# Patient Record
Sex: MALE | Race: WHITE | NOT HISPANIC OR LATINO | Employment: FULL TIME | URBAN - METROPOLITAN AREA
[De-identification: names, ages, dates, MRNs, and addresses within clinical notes are randomized per-mention and may not be internally consistent; named-entity substitution may affect disease eponyms.]

---

## 2017-04-08 ENCOUNTER — APPOINTMENT (OUTPATIENT)
Dept: CT IMAGING | Facility: CLINIC | Age: 42
End: 2017-04-08
Attending: EMERGENCY MEDICINE
Payer: COMMERCIAL

## 2017-04-08 ENCOUNTER — HOSPITAL ENCOUNTER (EMERGENCY)
Facility: CLINIC | Age: 42
Discharge: HOME | End: 2017-04-08
Attending: EMERGENCY MEDICINE | Admitting: EMERGENCY MEDICINE
Payer: COMMERCIAL

## 2017-04-08 VITALS
SYSTOLIC BLOOD PRESSURE: 126 MMHG | OXYGEN SATURATION: 97 % | TEMPERATURE: 97.8 F | BODY MASS INDEX: 23.19 KG/M2 | RESPIRATION RATE: 16 BRPM | WEIGHT: 175 LBS | HEIGHT: 73 IN | DIASTOLIC BLOOD PRESSURE: 94 MMHG | HEART RATE: 71 BPM

## 2017-04-08 DIAGNOSIS — N20.1 RIGHT DISTAL URETERAL CALCULUS: ICD-10-CM

## 2017-04-08 LAB
ALBUMIN SERPL-MCNC: 4.2 G/DL (ref 3.4–5)
ALBUMIN UR-MCNC: 10 MG/DL
ALP SERPL-CCNC: 64 U/L (ref 40–150)
ALT SERPL W P-5'-P-CCNC: 25 U/L (ref 0–70)
ANION GAP SERPL CALCULATED.3IONS-SCNC: 7 MMOL/L (ref 3–14)
APPEARANCE UR: CLEAR
AST SERPL W P-5'-P-CCNC: 19 U/L (ref 0–45)
BASOPHILS # BLD AUTO: 0 10E9/L (ref 0–0.2)
BASOPHILS NFR BLD AUTO: 0.3 %
BILIRUB SERPL-MCNC: 1.7 MG/DL (ref 0.2–1.3)
BILIRUB UR QL STRIP: NEGATIVE
BUN SERPL-MCNC: 9 MG/DL (ref 7–30)
CALCIUM SERPL-MCNC: 8.9 MG/DL (ref 8.5–10.1)
CHLORIDE SERPL-SCNC: 103 MMOL/L (ref 94–109)
CO2 SERPL-SCNC: 29 MMOL/L (ref 20–32)
COLOR UR AUTO: YELLOW
CREAT SERPL-MCNC: 0.9 MG/DL (ref 0.66–1.25)
DIFFERENTIAL METHOD BLD: ABNORMAL
EOSINOPHIL # BLD AUTO: 0.1 10E9/L (ref 0–0.7)
EOSINOPHIL NFR BLD AUTO: 1 %
ERYTHROCYTE [DISTWIDTH] IN BLOOD BY AUTOMATED COUNT: 12.4 % (ref 10–15)
GFR SERPL CREATININE-BSD FRML MDRD: ABNORMAL ML/MIN/1.7M2
GLUCOSE SERPL-MCNC: 102 MG/DL (ref 70–99)
GLUCOSE UR STRIP-MCNC: NEGATIVE MG/DL
HCT VFR BLD AUTO: 50.8 % (ref 40–53)
HGB BLD-MCNC: 17.9 G/DL (ref 13.3–17.7)
HGB UR QL STRIP: ABNORMAL
IMM GRANULOCYTES # BLD: 0 10E9/L (ref 0–0.4)
IMM GRANULOCYTES NFR BLD: 0.4 %
KETONES UR STRIP-MCNC: NEGATIVE MG/DL
LEUKOCYTE ESTERASE UR QL STRIP: NEGATIVE
LYMPHOCYTES # BLD AUTO: 2.4 10E9/L (ref 0.8–5.3)
LYMPHOCYTES NFR BLD AUTO: 34.2 %
MCH RBC QN AUTO: 33.1 PG (ref 26.5–33)
MCHC RBC AUTO-ENTMCNC: 35.2 G/DL (ref 31.5–36.5)
MCV RBC AUTO: 94 FL (ref 78–100)
MONOCYTES # BLD AUTO: 0.7 10E9/L (ref 0–1.3)
MONOCYTES NFR BLD AUTO: 9.3 %
MUCOUS THREADS #/AREA URNS LPF: PRESENT /LPF
NEUTROPHILS # BLD AUTO: 3.8 10E9/L (ref 1.6–8.3)
NEUTROPHILS NFR BLD AUTO: 54.8 %
NITRATE UR QL: NEGATIVE
NRBC # BLD AUTO: 0 10*3/UL
NRBC BLD AUTO-RTO: 0 /100
PH UR STRIP: 5.5 PH (ref 5–7)
PLATELET # BLD AUTO: 261 10E9/L (ref 150–450)
POTASSIUM SERPL-SCNC: 4 MMOL/L (ref 3.4–5.3)
PROT SERPL-MCNC: 7.8 G/DL (ref 6.8–8.8)
RBC # BLD AUTO: 5.41 10E12/L (ref 4.4–5.9)
RBC #/AREA URNS AUTO: 39 /HPF (ref 0–2)
SODIUM SERPL-SCNC: 139 MMOL/L (ref 133–144)
SP GR UR STRIP: 1.01 (ref 1–1.03)
URN SPEC COLLECT METH UR: ABNORMAL
UROBILINOGEN UR STRIP-MCNC: NORMAL MG/DL (ref 0–2)
WBC # BLD AUTO: 7 10E9/L (ref 4–11)
WBC #/AREA URNS AUTO: 2 /HPF (ref 0–2)

## 2017-04-08 PROCEDURE — 81001 URINALYSIS AUTO W/SCOPE: CPT | Performed by: EMERGENCY MEDICINE

## 2017-04-08 PROCEDURE — 96361 HYDRATE IV INFUSION ADD-ON: CPT

## 2017-04-08 PROCEDURE — 96374 THER/PROPH/DIAG INJ IV PUSH: CPT

## 2017-04-08 PROCEDURE — 99285 EMERGENCY DEPT VISIT HI MDM: CPT | Mod: 25

## 2017-04-08 PROCEDURE — 85025 COMPLETE CBC W/AUTO DIFF WBC: CPT | Performed by: EMERGENCY MEDICINE

## 2017-04-08 PROCEDURE — 74176 CT ABD & PELVIS W/O CONTRAST: CPT

## 2017-04-08 PROCEDURE — 25000128 H RX IP 250 OP 636: Performed by: EMERGENCY MEDICINE

## 2017-04-08 PROCEDURE — 80053 COMPREHEN METABOLIC PANEL: CPT | Performed by: EMERGENCY MEDICINE

## 2017-04-08 RX ORDER — ONDANSETRON 4 MG/1
4 TABLET, ORALLY DISINTEGRATING ORAL EVERY 4 HOURS PRN
Qty: 10 TABLET | Refills: 0 | Status: SHIPPED | OUTPATIENT
Start: 2017-04-08 | End: 2023-07-07

## 2017-04-08 RX ORDER — KETOROLAC TROMETHAMINE 30 MG/ML
30 INJECTION, SOLUTION INTRAMUSCULAR; INTRAVENOUS ONCE
Status: COMPLETED | OUTPATIENT
Start: 2017-04-08 | End: 2017-04-08

## 2017-04-08 RX ORDER — OXYCODONE AND ACETAMINOPHEN 5; 325 MG/1; MG/1
1-2 TABLET ORAL EVERY 4 HOURS PRN
Qty: 20 TABLET | Refills: 0 | Status: SHIPPED | OUTPATIENT
Start: 2017-04-08 | End: 2023-07-07

## 2017-04-08 RX ORDER — TAMSULOSIN HYDROCHLORIDE 0.4 MG/1
0.4 CAPSULE ORAL DAILY
Qty: 5 CAPSULE | Refills: 0 | Status: SHIPPED | OUTPATIENT
Start: 2017-04-08 | End: 2023-07-07

## 2017-04-08 RX ADMIN — SODIUM CHLORIDE 1000 ML: 9 INJECTION, SOLUTION INTRAVENOUS at 06:22

## 2017-04-08 RX ADMIN — KETOROLAC TROMETHAMINE 30 MG: 30 INJECTION, SOLUTION INTRAMUSCULAR at 06:24

## 2017-04-08 ASSESSMENT — ENCOUNTER SYMPTOMS
FLANK PAIN: 1
FEVER: 0
HEMATURIA: 0
NAUSEA: 0
CONSTIPATION: 0
VOMITING: 0
BLOOD IN STOOL: 0
DYSURIA: 1
DIARRHEA: 0
CHILLS: 0

## 2017-04-08 NOTE — ED AVS SNAPSHOT
Emergency Department    [address] [place] [address]    Phone:  [phone]    Fax:  [phone]                                       Jono Hensley   MRN: [id number]    Department:   Emergency Department   Date of Visit:  4/8/2017           After Visit Summary Signature Page     I have received my discharge instructions, and my questions have been answered. I have discussed any challenges I see with this plan with the nurse or doctor.    ..........................................................................................................................................  Patient/Patient Representative Signature      ..........................................................................................................................................  Patient Representative Print Name and Relationship to Patient    ..................................................               ................................................  Date                                            Time    ..........................................................................................................................................  Reviewed by Signature/Title    ...................................................              ..............................................  Date                                                            Time

## 2017-04-08 NOTE — ED PROVIDER NOTES
"  History     Chief Complaint:  Flank pain     HPI   Jono Hensley is a 41 year old male who presents to the emergency department today with flank pain. Today at 0100 the patient had onset of dysuria, but denies hematuria. Today at 0500 the patient had onset of right sided waxing and waning flank pain that radiates to the right lower quadrant of his abdominal and right anterior groin. Here in the ED, he states that his pain is improved compared to the initial onset. He states that his current symptoms are similar is prior kidney stone. Denies fevers or chills. Denies changes to bowel habits. Denies nausea or vomiting.     Allergies:  No Known Drug Allergies     Medications:    The patient is currently on no regular medications.      Past Medical History:    Kidney stone     Past Surgical History:    History reviewed. No pertinent past surgical history.     Family History:    History reviewed. No pertinent family history.        Social History:  Smoking Status: Never smoker  Alcohol Use: Yes     Marital Status:  Single     Review of Systems   Constitutional: Negative for chills and fever.   Gastrointestinal: Negative for blood in stool, constipation, diarrhea, nausea and vomiting.   Genitourinary: Positive for dysuria and flank pain. Negative for hematuria.   All other systems reviewed and are negative.    Physical Exam   First Vitals:  BP: 141/89  Heart Rate: 76  Temp: 97.8  F (36.6  C)  Resp: 16  Height: 185.4 cm (6' 1\")  Weight: 79.4 kg (175 lb)  SpO2: 97 %    Physical Exam  Nursing note and vitals reviewed.  Constitutional:  Appears well-developed and well-nourished.   HENT:   Head:    Atraumatic.   Mouth/Throat:   Oropharynx is clear and moist. No oropharyngeal exudate.   Eyes:    Pupils are equal, round, and reactive to light.   Neck:    Normal range of motion. Neck supple.      No tracheal deviation present. No thyromegaly present.   Cardiovascular:  Normal rate, regular rhythm, no murmur "   Pulmonary/Chest: Breath sounds are clear and equal without wheezes or crackles.  Abdominal:   Soft. Bowel sounds are normal. Exhibits no distension and      no mass. There is no tenderness.      There is no rebound and no guarding.   :   Testicles are nontender without swelling. No palpable inguinal hernia.   Musculoskeletal:  Exhibits no edema.   Back:   No CVA tenderness.   Lymphadenopathy:  No cervical adenopathy.   Neurological:   Alert and oriented to person, place, and time.   Skin:    Skin is warm and dry. No rash noted. No pallor.      Emergency Department Course     Imaging:  Radiology findings were communicated with the patient who voiced understanding of the findings.    CT Abdomen Pelvis w/o Contrast:   IMPRESSION: There is a 0.2 cm calculus in the right ureterovesical  junction resulting in mild right hydronephrosis.  Result per radiology     Laboratory:  Laboratory findings were communicated with the patient who voiced understanding of the findings.    CBC:WBC 7.0, HGB 17.9 (H),    CMP: Glucose 102 (H), Bilirubin 1.7 (H) o/w WNL Creatinine 0.90     UA: Moderate Blood (A), Protein albumin 10 (A), RBC 39 (H), Mucous Present (A) o/w Negative      Interventions:  0622 NS 1000 mL IV   0624 Toradol 30 mg IV     Emergency Department Course:  Nursing notes and vitals reviewed.  I performed an exam of the patient as documented above.   IV was inserted and blood was drawn for laboratory testing, results above.   The patient provided a urine sample here in the emergency department. This was sent for laboratory testing, findings above.   The patient was sent for a CT Abdomen Pelvis w/o Contrast while in the emergency department, results above.    I discussed the treatment plan with the patient. They expressed understanding of this plan and consented to discharge. They will be discharged home with instructions for care and follow up. In addition, the patient will return to the emergency department if  their symptoms persist, worsen, if new symptoms arise or if there is any concern.  All questions were answered.     Impression & Plan      Medical Decision Making:  Jono Hensley is a 41 year old male who presents with flank pain. A broad differential diagnosis was considered including diverticulitis, ureterolithiasis, tumor, colitis, cholecystitis, aneurysm, dissection, volvulus, appendicitis, splenic issue, stomach pathology, ulcer, hydronephrosis, pneumonia, rib fracture, UTI, pyelonephritis amongst many other etiologies.   Signs and symptoms consistent with ureterolithiasis and CT of the abdomen confirms this. No sign of pyelonephritis on urineanalysis  Patients pain is controlled in ED and given flomax.  No signs of infected stone.  Patient is hemodynamically stable in ED. Plan is home with abdominal pain recheck by primary care physician or return to ED if symptoms worsen.  Return for fevers greater than 102, increasing pain, other new symptoms develop.  Ureterolithiasis precautions for home.  Questions were answered.     Diagnosis:    ICD-10-CM    1. Right distal ureteral calculus N20.1       Discharge Medications:  New Prescriptions    ONDANSETRON (ZOFRAN ODT) 4 MG ODT TAB    Take 1 tablet (4 mg) by mouth every 4 hours as needed for nausea    OXYCODONE-ACETAMINOPHEN (PERCOCET) 5-325 MG PER TABLET    Take 1-2 tablets by mouth every 4 hours as needed for moderate to severe pain    TAMSULOSIN (FLOMAX) 0.4 MG CAPSULE    Take 1 capsule (0.4 mg) by mouth daily Take at bedtime       Scribe Disclosure:  Edgar JOHNSTON, am serving as a scribe at 6:12 AM on 4/8/2017 to document services personally performed by Starla Vega MD, based on my observations and the provider's statements to me.   4/8/2017    EMERGENCY DEPARTMENT       Starla Vega MD  04/08/17 3200

## 2017-04-08 NOTE — DISCHARGE INSTRUCTIONS
Take ibuprofen 600 mg every 6 hours as needed for pain (take with food).    Drink plenty of water/fluids.    Strain your urine to try to collect the stone.    Opioid Medication Information    You have been given a prescription for an opioid (narcotic) pain medicine and/or have received a pain medicine while here in the Emergency Department. These medicines can make you drowsy or impaired. You must not drive, operate dangerous equipment, or engage in any other dangerous activities while taking these medications. If you drive while taking these medications, you could be arrested for DUI, or driving under the influence. Do not drink any alcohol while you are taking these medications.   Opioid pain medications can cause addiction. If you have a history of chemical dependency of any type, you are at a higher risk of becoming addicted to pain medications.  Only take these prescribed medications to treat your pain when all other options have been tried. Take it for as short a time and as few doses as possible. Store your pain pills in a secure place, as they are frequently stolen and provide a dangerous opportunity for children or visitors in your house to start abusing these powerful medications. We will not replace any lost or stolen medicine.  As soon as your pain is better, you should flush all your remaining medication.   Many prescription pain medications contain Tylenol  (acetaminophen), including Vicodin , Tylenol #3 , Norco , Lortab , and Percocet .  You should not take any extra pills of Tylenol  if you are using these prescription medications or you can get very sick.  Do not ever take more than 4000 mg of acetaminophen in any 24 hour period.  All opioids tend to cause constipation. Drink plenty of water and eat foods that have a lot of fiber, such as fruits, vegetables, prune juice, apple juice and high fiber cereal.  Take a laxative if you don t move your bowels at least every other day. Miralax , Milk of  Magnesia, Colace , or Senna  can be used to keep you regular.

## 2017-04-08 NOTE — ED AVS SNAPSHOT
Emergency Department    [address] [facility] [address]    Phone:  [phone]    Fax:  [phone]                                       Jono Hensley   MRN: [id number]    Department:   Emergency Department   Date of Visit:  4/8/2017           Patient Information     Date Of Birth          1975        Your diagnoses for this visit were:     Right distal ureteral calculus        You were seen by Starla Vega MD.      Follow-up Information     Follow up with [facility]    Why:  call Monday to schedule a follow-up appointment.    Contact information:    [address] [facility] [address]  [address]  [facility] [phone]        Follow up with  Emergency Department.    Specialty:  EMERGENCY MEDICINE    Why:  As needed for severe pain, recurrent vomiting or fever.    Contact information:    [address] [facility] [address] [phone]        Discharge Instructions       Take ibuprofen 600 mg every 6 hours as needed for pain (take with food).    Drink plenty of water/fluids.    Strain your urine to try to collect the stone.    Opioid Medication Information    You have been given a prescription for an opioid (narcotic) pain medicine and/or have received a pain medicine while here in the Emergency Department. These medicines can make you drowsy or impaired. You must not drive, operate dangerous equipment, or engage in any other dangerous activities while taking these medications. If you drive while taking these medications, you could be arrested for DUI, or driving under the influence. Do not drink any alcohol while you are taking these medications.   Opioid pain medications can cause addiction. If you have a history of chemical dependency of any type, you are at a higher risk of becoming addicted to pain medications.  Only take these prescribed medications to treat your pain when all other options have been tried. Take it for as short a time and as  few doses as possible. Store your pain pills in a secure place, as they are frequently stolen and provide a dangerous opportunity for children or visitors in your house to start abusing these powerful medications. We will not replace any lost or stolen medicine.  As soon as your pain is better, you should flush all your remaining medication.   Many prescription pain medications contain Tylenol  (acetaminophen), including Vicodin , Tylenol #3 , Norco , Lortab , and Percocet .  You should not take any extra pills of Tylenol  if you are using these prescription medications or you can get very sick.  Do not ever take more than 4000 mg of acetaminophen in any 24 hour period.  All opioids tend to cause constipation. Drink plenty of water and eat foods that have a lot of fiber, such as fruits, vegetables, prune juice, apple juice and high fiber cereal.  Take a laxative if you don t move your bowels at least every other day. Miralax , Milk of Magnesia, Colace , or Senna  can be used to keep you regular.            Discharge References/Attachments     KIDNEY STONE, PASSED (ENGLISH)      24 Hour Appointment Hotline       To make an appointment at any [facility], call [phone] ([phone]). If you don't have a family doctor or clinic, we will help you find one. [facility] are conveniently located to serve the needs of you and your family.             Review of your medicines      START taking        Dose / Directions Last dose taken    ondansetron 4 MG ODT tab   Commonly known as:  ZOFRAN ODT   Dose:  4 mg   Quantity:  10 tablet        Take 1 tablet (4 mg) by mouth every 4 hours as needed for nausea   Refills:  0        tamsulosin 0.4 MG capsule   Commonly known as:  FLOMAX   Dose:  0.4 mg   Quantity:  5 capsule        Take 1 capsule (0.4 mg) by mouth daily Take at bedtime   Refills:  0          ASK your doctor about these medications        Dose / Directions Last dose taken    * oxyCODONE-acetaminophen  5-325 MG per tablet   Commonly known as:  PERCOCET   Dose:  1-2 tablet   What changed:  Another medication with the same name was added. Make sure you understand how and when to take each.   Quantity:  20 tablet   Ask about: Which instructions should I use?        Take 1-2 tablets by mouth every 4 hours as needed for pain   Refills:  0        * oxyCODONE-acetaminophen 5-325 MG per tablet   Commonly known as:  PERCOCET   Dose:  1-2 tablet   What changed:  You were already taking a medication with the same name, and this prescription was added. Make sure you understand how and when to take each.   Quantity:  20 tablet   Ask about: Which instructions should I use?        Take 1-2 tablets by mouth every 4 hours as needed for moderate to severe pain   Refills:  0        * Notice:  This list has 2 medication(s) that are the same as other medications prescribed for you. Read the directions carefully, and ask your doctor or other care provider to review them with you.            Prescriptions were sent or printed at these locations (3 Prescriptions)                   Other Prescriptions                Printed at Department/Unit printer (3 of 3)         oxyCODONE-acetaminophen (PERCOCET) 5-325 MG per tablet               tamsulosin (FLOMAX) 0.4 MG capsule               ondansetron (ZOFRAN ODT) 4 MG ODT tab                Procedures and tests performed during your visit     Abd/pelvis CT no contrast - Stone Protocol    CBC with platelets + differential    Comprehensive metabolic panel    UA with Microscopic      Orders Needing Specimen Collection     None      Pending Results     Date and Time Order Name Status Description    4/8/2017 0616 Abd/pelvis CT no contrast - Stone Protocol Preliminary             Pending Culture Results     No orders found from 4/6/2017 to 4/9/2017.            Test Results From Your Hospital Stay        4/8/2017  6:33 AM      Component Results     Component Value Ref Range & Units Status    WBC 7.0  4.0 - 11.0 10e9/L Final    RBC Count 5.41 4.4 - 5.9 10e12/L Final    Hemoglobin 17.9 (H) 13.3 - 17.7 g/dL Final    Hematocrit 50.8 40.0 - 53.0 % Final    MCV 94 78 - 100 fl Final    MCH 33.1 (H) 26.5 - 33.0 pg Final    MCHC 35.2 31.5 - 36.5 g/dL Final    RDW 12.4 10.0 - 15.0 % Final    Platelet Count 261 150 - 450 10e9/L Final    Diff Method Automated Method  Final    % Neutrophils 54.8 % Final    % Lymphocytes 34.2 % Final    % Monocytes 9.3 % Final    % Eosinophils 1.0 % Final    % Basophils 0.3 % Final    % Immature Granulocytes 0.4 % Final    Nucleated RBCs 0 0 /100 Final    Absolute Neutrophil 3.8 1.6 - 8.3 10e9/L Final    Absolute Lymphocytes 2.4 0.8 - 5.3 10e9/L Final    Absolute Monocytes 0.7 0.0 - 1.3 10e9/L Final    Absolute Eosinophils 0.1 0.0 - 0.7 10e9/L Final    Absolute Basophils 0.0 0.0 - 0.2 10e9/L Final    Abs Immature Granulocytes 0.0 0 - 0.4 10e9/L Final    Absolute Nucleated RBC 0.0  Final         4/8/2017  6:50 AM      Component Results     Component Value Ref Range & Units Status    Sodium 139 133 - 144 mmol/L Final    Potassium 4.0 3.4 - 5.3 mmol/L Final    Chloride 103 94 - 109 mmol/L Final    Carbon Dioxide 29 20 - 32 mmol/L Final    Anion Gap 7 3 - 14 mmol/L Final    Glucose 102 (H) 70 - 99 mg/dL Final    Urea Nitrogen 9 7 - 30 mg/dL Final    Creatinine 0.90 0.66 - 1.25 mg/dL Final    GFR Estimate >90  Non  GFR Calc   >60 mL/min/1.7m2 Final    GFR Estimate If Black >90   GFR Calc   >60 mL/min/1.7m2 Final    Calcium 8.9 8.5 - 10.1 mg/dL Final    Bilirubin Total 1.7 (H) 0.2 - 1.3 mg/dL Final    Albumin 4.2 3.4 - 5.0 g/dL Final    Protein Total 7.8 6.8 - 8.8 g/dL Final    Alkaline Phosphatase 64 40 - 150 U/L Final    ALT 25 0 - 70 U/L Final    AST 19 0 - 45 U/L Final         4/8/2017  6:37 AM      Component Results     Component Value Ref Range & Units Status    Color Urine Yellow  Final    Appearance Urine Clear  Final    Glucose Urine Negative NEG mg/dL Final     Bilirubin Urine Negative NEG Final    Ketones Urine Negative NEG mg/dL Final    Specific Gravity Urine 1.014 1.003 - 1.035 Final    Blood Urine Moderate (A) NEG Final    pH Urine 5.5 5.0 - 7.0 pH Final    Protein Albumin Urine 10 (A) NEG mg/dL Final    Urobilinogen mg/dL Normal 0.0 - 2.0 mg/dL Final    Nitrite Urine Negative NEG Final    Leukocyte Esterase Urine Negative NEG Final    Source Midstream Urine  Final    WBC Urine 2 0 - 2 /HPF Final    RBC Urine 39 (H) 0 - 2 /HPF Final    Mucous Urine Present (A) NEG /LPF Final         4/8/2017  7:11 AM      Narrative     CT ABDOMEN AND PELVIS WITHOUT CONTRAST  4/8/2017 7:01 AM    HISTORY: Right flank pain. The concern is for a urinary tract  calculus.    COMPARISON: A CT on 10/20/2014.    TECHNIQUE: Routine transverse CT imaging of the abdomen and pelvis was  performed without contrast. Radiation dose for this scan was reduced  using automated exposure control, adjustment of the mA and/or kV  according to patient size, or iterative reconstruction technique.    FINDINGS: The visualized lung bases are clear. There has been  development of a 0.2 cm calculus in the right ureterovesical junction.  This is demonstrated on the transverse series 2 image 80. There is  mild prominence of the right renal collecting system. Again seen is a  just over 1 cm cyst in the superior pole of the right kidney. No other  urinary tract abnormality is noted. A previously seen left  ureterovesical junction stone is no longer present. The liver, spleen,  pancreas, gallbladder, and adrenal glands are normal allowing for the  absence of contrast. No enlarged lymph node or other abnormal mass is  demonstrated. No free fluid is seen. No free intraperitoneal gas is  identified. The gastrointestinal tract is unremarkable. The appendix  is not identified. There is no additional evidence of appendicitis. No  vascular abnormality is seen. The osseous structures are unremarkable.  No abdominal or  pelvic wall pathology is demonstrated.         Impression     IMPRESSION: There is a 0.2 cm calculus in the right ureterovesical  junction resulting in mild right hydronephrosis.                Clinical Quality Measure: Blood Pressure Screening     Your blood pressure was checked while you were in the emergency department today. The last reading we obtained was  BP: 141/89 . Please read the guidelines below about what these numbers mean and what you should do about them.  If your systolic blood pressure (the top number) is less than 120 and your diastolic blood pressure (the bottom number) is less than 80, then your blood pressure is normal. There is nothing more that you need to do about it.  If your systolic blood pressure (the top number) is 120-139 or your diastolic blood pressure (the bottom number) is 80-89, your blood pressure may be higher than it should be. You should have your blood pressure rechecked within a year by a primary care provider.  If your systolic blood pressure (the top number) is 140 or greater or your diastolic blood pressure (the bottom number) is 90 or greater, you may have high blood pressure. High blood pressure is treatable, but if left untreated over time it can put you at risk for heart attack, stroke, or kidney failure. You should have your blood pressure rechecked by a primary care provider within the next 4 weeks.  If your provider in the emergency department today gave you specific instructions to follow-up with your doctor or provider even sooner than that, you should follow that instruction and not wait for up to 4 weeks for your follow-up visit.        Thank you for choosing [facility]       Thank you for choosing [facility] for your care. Our goal is always to provide you with excellent care. Hearing back from our patients is one way we can continue to improve our services. Please take a few minutes to complete the written survey that you may receive in the mail after you visit  "with us. Thank you!        [facility] Information     [facility] lets you send messages to your doctor, view your test results, renew your prescriptions, schedule appointments and more. To sign up, go to [email or web address] . Click on \"Log in\" on the left side of the screen, which will take you to the Welcome page. Then click on \"Sign up Now\" on the right side of the page.     You will be asked to enter the access code listed below, as well as some personal information. Please follow the directions to create your username and password.     Your access code is: [email or web address]  Expires: 2017  7:24 AM     Your access code will  in 90 days. If you need help or a new code, please call your [facility] clinic or [phone].        Care EveryWhere ID     This is your Care EveryWhere ID. This could be used by other organizations to access your [facility] medical records  [id number]        After Visit Summary       This is your record. Keep this with you and show to your community pharmacist(s) and doctor(s) at your next visit.                  "

## 2023-04-15 ENCOUNTER — HEALTH MAINTENANCE LETTER (OUTPATIENT)
Age: 48
End: 2023-04-15

## 2023-06-14 ENCOUNTER — OFFICE VISIT (OUTPATIENT)
Dept: OPTOMETRY | Facility: CLINIC | Age: 48
End: 2023-06-14
Payer: COMMERCIAL

## 2023-06-14 DIAGNOSIS — H52.223 REGULAR ASTIGMATISM OF BOTH EYES: ICD-10-CM

## 2023-06-14 DIAGNOSIS — D31.31 CHOROIDAL NEVUS OF RIGHT EYE: ICD-10-CM

## 2023-06-14 DIAGNOSIS — Z01.01 ENCOUNTER FOR EXAMINATION OF EYES AND VISION WITH ABNORMAL FINDINGS: Primary | ICD-10-CM

## 2023-06-14 DIAGNOSIS — H52.4 PRESBYOPIA: ICD-10-CM

## 2023-06-14 DIAGNOSIS — H10.402 CHRONIC CONJUNCTIVITIS OF LEFT EYE, UNSPECIFIED CHRONIC CONJUNCTIVITIS TYPE: ICD-10-CM

## 2023-06-14 DIAGNOSIS — H52.13 MYOPIA OF BOTH EYES: ICD-10-CM

## 2023-06-14 PROBLEM — R73.01 IMPAIRED FASTING GLUCOSE: Status: ACTIVE | Noted: 2021-11-15

## 2023-06-14 PROBLEM — M79.604 LEG PAIN, ANTERIOR, RIGHT: Status: ACTIVE | Noted: 2017-10-18

## 2023-06-14 PROBLEM — K58.0 IRRITABLE BOWEL SYNDROME WITH DIARRHEA: Status: ACTIVE | Noted: 2018-10-11

## 2023-06-14 PROBLEM — H69.91 DYSFUNCTION OF RIGHT EUSTACHIAN TUBE: Status: ACTIVE | Noted: 2018-10-11

## 2023-06-14 PROBLEM — J30.9 ALLERGIC RHINITIS: Status: ACTIVE | Noted: 2023-06-14

## 2023-06-14 PROBLEM — L43.9 LICHEN PLANUS: Status: ACTIVE | Noted: 2018-10-10

## 2023-06-14 PROCEDURE — 92015 DETERMINE REFRACTIVE STATE: CPT | Performed by: OPTOMETRIST

## 2023-06-14 PROCEDURE — 92004 COMPRE OPH EXAM NEW PT 1/>: CPT | Performed by: OPTOMETRIST

## 2023-06-14 RX ORDER — NEOMYCIN SULFATE, POLYMYXIN B SULFATE AND DEXAMETHASONE 3.5; 10000; 1 MG/ML; [USP'U]/ML; MG/ML
SUSPENSION/ DROPS OPHTHALMIC
Qty: 1.2 ML | Refills: 0 | Status: SHIPPED | OUTPATIENT
Start: 2023-06-14 | End: 2023-06-22

## 2023-06-14 ASSESSMENT — VISUAL ACUITY
OD_SC: 20/20-2
METHOD: SNELLEN - LINEAR
OS_SC: 20/70
OD_SC: 20/70
OS_SC: 20/20-1

## 2023-06-14 ASSESSMENT — REFRACTION_MANIFEST
METHOD_AUTOREFRACTION: 1
OD_CYLINDER: +1.25
OD_SPHERE: -2.00
OD_CYLINDER: +1.00
OS_CYLINDER: +1.75
OS_AXIS: 172
OD_AXIS: 021
OD_AXIS: 030
OS_AXIS: 161
OS_CYLINDER: +1.25
OS_SPHERE: -2.50
OD_SPHERE: -1.75
OS_SPHERE: -2.25
OD_ADD: +1.75
OS_ADD: +1.75

## 2023-06-14 ASSESSMENT — TONOMETRY
IOP_METHOD: APPLANATION
OS_IOP_MMHG: 10
OD_IOP_MMHG: 10

## 2023-06-14 ASSESSMENT — CONF VISUAL FIELD
OS_INFERIOR_NASAL_RESTRICTION: 0
OS_SUPERIOR_NASAL_RESTRICTION: 0
OD_INFERIOR_TEMPORAL_RESTRICTION: 0
OD_SUPERIOR_NASAL_RESTRICTION: 0
OD_SUPERIOR_TEMPORAL_RESTRICTION: 0
METHOD: COUNTING FINGERS
OS_NORMAL: 1
OD_INFERIOR_NASAL_RESTRICTION: 0
OD_NORMAL: 1
OS_SUPERIOR_TEMPORAL_RESTRICTION: 0
OS_INFERIOR_TEMPORAL_RESTRICTION: 0

## 2023-06-14 ASSESSMENT — CUP TO DISC RATIO
OD_RATIO: 0.2
OS_RATIO: 0.2

## 2023-06-14 ASSESSMENT — KERATOMETRY
OD_AXISANGLE_DEGREES: 035
OD_AXISANGLE2_DEGREES: 125
OS_AXISANGLE2_DEGREES: 064
OS_K2POWER_DIOPTERS: 47.25
OS_AXISANGLE_DEGREES: 154
OS_K1POWER_DIOPTERS: 46.50
OD_K2POWER_DIOPTERS: 46.75
OD_K1POWER_DIOPTERS: 46.25

## 2023-06-14 ASSESSMENT — SLIT LAMP EXAM - LIDS
COMMENTS: NORMAL
COMMENTS: NORMAL

## 2023-06-14 ASSESSMENT — EXTERNAL EXAM - RIGHT EYE: OD_EXAM: NORMAL

## 2023-06-14 ASSESSMENT — EXTERNAL EXAM - LEFT EYE: OS_EXAM: NORMAL

## 2023-06-14 NOTE — LETTER
6/14/2023         RE: Jono Hensley  [address]  [facility] [address]        Dear Colleague,    Thank you for referring your patient, Jono Hensley, to the [facility]. Please see a copy of my visit note below.    Chief Complaint   Patient presents with     Annual Eye Exam      Accompanied by partner, Romy    -Was on course of Tobrex in May for conjunctivitis  - completed recently     Last Eye Exam: 10+ yrs   Dilated Previously: Yes, side effects of dilation explained today    What are you currently using to see?  Glasses - SVL - wears only for watching TV at night - did not bring with today      Distance Vision Acuity: Noticed gradual change in left eye     Near Vision Acuity: Satisfied with vision while reading and using computer unaided    Eye Comfort: dry, watery and itchy - L>R - states his left eye has been watering/red frequently for ~10 yrs. Reports he gets a few bouts of conjunctivitis per year     Do you use eye drops? : Yes: OTC allergy drops BID for the past week or so since discontinuing antibiotic drops     Occupation or Hobbies: [employer] cars - very tammy environment - does not wear safety glasses    [facility]        Medical, surgical and family histories reviewed and updated 6/14/2023.       OBJECTIVE: See Ophthalmology exam    ASSESSMENT:    ICD-10-CM    1. Encounter for examination of eyes and vision with abnormal findings  Z01.01 EYE EXAM (SIMPLE-NONBILLABLE)      2. Chronic conjunctivitis of left eye, unspecified chronic conjunctivitis type  H10.402 EYE EXAM (SIMPLE-NONBILLABLE)     neomycin-polymyxin-dexamethasone (MAXITROL) 3.5-07075-2.1 SUSP ophthalmic susp      3. Choroidal nevus of right eye  D31.31 EYE EXAM (SIMPLE-NONBILLABLE)      4. Myopia of both eyes  H52.13 EYE EXAM (SIMPLE-NONBILLABLE)     REFRACTION      5. Regular astigmatism of both eyes  H52.223 EYE EXAM (SIMPLE-NONBILLABLE)     REFRACTION      6. Presbyopia  H52.4 EYE EXAM  (SIMPLE-NONBILLABLE)     REFRACTION          PLAN:     Patient Instructions   Begin course of Maxitrol eyedrops. Use 4x daily in the left eye for 4 days, then reduce to 2x daily left eye for 4 days, then discontinue.     Continue allergy eyedrops as needed.     Distance glasses prescription provided today.   Allow 2 weeks to adapt to the new glasses.   Wear glasses for driving.     Return in 1 year for a comprehensive eye exam, or sooner if needed.      The effects of the dilating drops last for 4- 6 hours.  You will be more sensitive to light and vision will be blurry up close.  Mydriatic sunglasses were given if needed.     Guido Agosto, ESSIE  [address]  [place]  [id number] [phone]            Again, thank you for allowing me to participate in the care of your patient.        Sincerely,        Guido Agosto OD

## 2023-06-14 NOTE — PATIENT INSTRUCTIONS
Begin course of Maxitrol eyedrops. Use 4x daily in the left eye for 4 days, then reduce to 2x daily left eye for 4 days, then discontinue.     Continue allergy eyedrops as needed.     Distance glasses prescription provided today.   Allow 2 weeks to adapt to the new glasses.   Wear glasses for driving.     Return in 1 year for a comprehensive eye exam, or sooner if needed.      The effects of the dilating drops last for 4- 6 hours.  You will be more sensitive to light and vision will be blurry up close.  Mydriatic sunglasses were given if needed.     Guido Agosto, OD  [facility]  [address] [place] [address]. [place]  [address]    [phone]

## 2023-06-14 NOTE — PROGRESS NOTES
Chief Complaint   Patient presents with     Annual Eye Exam      Accompanied by partner, Romy    -Was on course of Tobrex in May for conjunctivitis  - completed recently     Last Eye Exam: 10+ yrs   Dilated Previously: Yes, side effects of dilation explained today    What are you currently using to see?  Glasses - SVL - wears only for watching TV at night - did not bring with today      Distance Vision Acuity: Noticed gradual change in left eye     Near Vision Acuity: Satisfied with vision while reading and using computer unaided    Eye Comfort: dry, watery and itchy - L>R - states his left eye has been watering/red frequently for ~10 yrs. Reports he gets a few bouts of conjunctivitis per year     Do you use eye drops? : Yes: OTC allergy drops BID for the past week or so since discontinuing antibiotic drops     Occupation or Hobbies: [employer] cars - very tammy environment - does not wear safety glasses    [facility]        Medical, surgical and family histories reviewed and updated 6/14/2023.       OBJECTIVE: See Ophthalmology exam    ASSESSMENT:    ICD-10-CM    1. Encounter for examination of eyes and vision with abnormal findings  Z01.01 EYE EXAM (SIMPLE-NONBILLABLE)      2. Chronic conjunctivitis of left eye, unspecified chronic conjunctivitis type  H10.402 EYE EXAM (SIMPLE-NONBILLABLE)     neomycin-polymyxin-dexamethasone (MAXITROL) 3.5-63328-3.1 SUSP ophthalmic susp      3. Choroidal nevus of right eye  D31.31 EYE EXAM (SIMPLE-NONBILLABLE)      4. Myopia of both eyes  H52.13 EYE EXAM (SIMPLE-NONBILLABLE)     REFRACTION      5. Regular astigmatism of both eyes  H52.223 EYE EXAM (SIMPLE-NONBILLABLE)     REFRACTION      6. Presbyopia  H52.4 EYE EXAM (SIMPLE-NONBILLABLE)     REFRACTION          PLAN:     Patient Instructions   Begin course of Maxitrol eyedrops. Use 4x daily in the left eye for 4 days, then reduce to 2x daily left eye for 4 days, then discontinue.     Continue allergy eyedrops as needed.      Distance glasses prescription provided today.   Allow 2 weeks to adapt to the new glasses.   Wear glasses for driving.     Return in 1 year for a comprehensive eye exam, or sooner if needed.      The effects of the dilating drops last for 4- 6 hours.  You will be more sensitive to light and vision will be blurry up close.  Mydriatic sunglasses were given if needed.     Guido Agosto, OD  [facility] [place]  [address] [place] [address]. [place]  [place]  [address]    [phone]

## 2023-07-07 ENCOUNTER — OFFICE VISIT (OUTPATIENT)
Dept: FAMILY MEDICINE | Facility: CLINIC | Age: 48
End: 2023-07-07
Payer: COMMERCIAL

## 2023-07-07 VITALS
TEMPERATURE: 97.8 F | HEART RATE: 73 BPM | RESPIRATION RATE: 16 BRPM | HEIGHT: 72 IN | BODY MASS INDEX: 25.6 KG/M2 | OXYGEN SATURATION: 98 % | DIASTOLIC BLOOD PRESSURE: 83 MMHG | SYSTOLIC BLOOD PRESSURE: 129 MMHG | WEIGHT: 189 LBS

## 2023-07-07 DIAGNOSIS — F41.9 ANXIETY: ICD-10-CM

## 2023-07-07 DIAGNOSIS — R00.2 PALPITATIONS: ICD-10-CM

## 2023-07-07 DIAGNOSIS — Z13.1 SCREENING FOR DIABETES MELLITUS: ICD-10-CM

## 2023-07-07 DIAGNOSIS — K58.0 IRRITABLE BOWEL SYNDROME WITH DIARRHEA: ICD-10-CM

## 2023-07-07 DIAGNOSIS — R07.89 ATYPICAL CHEST PAIN: ICD-10-CM

## 2023-07-07 DIAGNOSIS — Z11.59 NEED FOR HEPATITIS C SCREENING TEST: ICD-10-CM

## 2023-07-07 DIAGNOSIS — Z12.11 SCREEN FOR COLON CANCER: ICD-10-CM

## 2023-07-07 DIAGNOSIS — E78.00 HYPERCHOLESTEREMIA: ICD-10-CM

## 2023-07-07 DIAGNOSIS — Z00.00 ROUTINE HISTORY AND PHYSICAL EXAMINATION OF ADULT: Primary | ICD-10-CM

## 2023-07-07 DIAGNOSIS — Z11.4 SCREENING FOR HIV (HUMAN IMMUNODEFICIENCY VIRUS): ICD-10-CM

## 2023-07-07 DIAGNOSIS — L43.9 LICHEN PLANUS: ICD-10-CM

## 2023-07-07 LAB
ALBUMIN SERPL BCG-MCNC: 5 G/DL (ref 3.5–5.2)
ALP SERPL-CCNC: 68 U/L (ref 40–129)
ALT SERPL W P-5'-P-CCNC: 18 U/L (ref 0–70)
ANION GAP SERPL CALCULATED.3IONS-SCNC: 10 MMOL/L (ref 7–15)
AST SERPL W P-5'-P-CCNC: 20 U/L (ref 0–45)
BILIRUB SERPL-MCNC: 1.6 MG/DL
BUN SERPL-MCNC: 11.7 MG/DL (ref 6–20)
CALCIUM SERPL-MCNC: 9.6 MG/DL (ref 8.6–10)
CHLORIDE SERPL-SCNC: 103 MMOL/L (ref 98–107)
CHOLEST SERPL-MCNC: 281 MG/DL
CREAT SERPL-MCNC: 0.92 MG/DL (ref 0.67–1.17)
DEPRECATED HCO3 PLAS-SCNC: 28 MMOL/L (ref 22–29)
ERYTHROCYTE [DISTWIDTH] IN BLOOD BY AUTOMATED COUNT: 11.5 % (ref 10–15)
GFR SERPL CREATININE-BSD FRML MDRD: >90 ML/MIN/1.73M2
GLUCOSE SERPL-MCNC: 98 MG/DL (ref 70–99)
HBA1C MFR BLD: 5.3 % (ref 0–5.6)
HCT VFR BLD AUTO: 48.7 % (ref 40–53)
HDLC SERPL-MCNC: 52 MG/DL
HGB BLD-MCNC: 16.7 G/DL (ref 13.3–17.7)
LDLC SERPL CALC-MCNC: 193 MG/DL
MCH RBC QN AUTO: 31.7 PG (ref 26.5–33)
MCHC RBC AUTO-ENTMCNC: 34.3 G/DL (ref 31.5–36.5)
MCV RBC AUTO: 92 FL (ref 78–100)
NONHDLC SERPL-MCNC: 229 MG/DL
PLATELET # BLD AUTO: 273 10E3/UL (ref 150–450)
POTASSIUM SERPL-SCNC: 4.4 MMOL/L (ref 3.4–5.3)
PROT SERPL-MCNC: 7.6 G/DL (ref 6.4–8.3)
RBC # BLD AUTO: 5.27 10E6/UL (ref 4.4–5.9)
SODIUM SERPL-SCNC: 141 MMOL/L (ref 136–145)
TRIGL SERPL-MCNC: 182 MG/DL
TSH SERPL DL<=0.005 MIU/L-ACNC: 1.01 UIU/ML (ref 0.3–4.2)
WBC # BLD AUTO: 7.3 10E3/UL (ref 4–11)

## 2023-07-07 PROCEDURE — 36415 COLL VENOUS BLD VENIPUNCTURE: CPT | Performed by: INTERNAL MEDICINE

## 2023-07-07 PROCEDURE — 85027 COMPLETE CBC AUTOMATED: CPT | Performed by: INTERNAL MEDICINE

## 2023-07-07 PROCEDURE — 84443 ASSAY THYROID STIM HORMONE: CPT | Performed by: INTERNAL MEDICINE

## 2023-07-07 PROCEDURE — 93000 ELECTROCARDIOGRAM COMPLETE: CPT | Performed by: INTERNAL MEDICINE

## 2023-07-07 PROCEDURE — 80053 COMPREHEN METABOLIC PANEL: CPT | Performed by: INTERNAL MEDICINE

## 2023-07-07 PROCEDURE — 80061 LIPID PANEL: CPT | Performed by: INTERNAL MEDICINE

## 2023-07-07 PROCEDURE — 83036 HEMOGLOBIN GLYCOSYLATED A1C: CPT | Performed by: INTERNAL MEDICINE

## 2023-07-07 PROCEDURE — 99214 OFFICE O/P EST MOD 30 MIN: CPT | Mod: 25 | Performed by: INTERNAL MEDICINE

## 2023-07-07 PROCEDURE — 99386 PREV VISIT NEW AGE 40-64: CPT | Performed by: INTERNAL MEDICINE

## 2023-07-07 RX ORDER — BUSPIRONE HYDROCHLORIDE 5 MG/1
TABLET ORAL
Qty: 30 TABLET | Refills: 0 | Status: SHIPPED | OUTPATIENT
Start: 2023-07-07 | End: 2023-08-26

## 2023-07-07 ASSESSMENT — ANXIETY QUESTIONNAIRES
5. BEING SO RESTLESS THAT IT IS HARD TO SIT STILL: SEVERAL DAYS
6. BECOMING EASILY ANNOYED OR IRRITABLE: SEVERAL DAYS
IF YOU CHECKED OFF ANY PROBLEMS ON THIS QUESTIONNAIRE, HOW DIFFICULT HAVE THESE PROBLEMS MADE IT FOR YOU TO DO YOUR WORK, TAKE CARE OF THINGS AT HOME, OR GET ALONG WITH OTHER PEOPLE: SOMEWHAT DIFFICULT
7. FEELING AFRAID AS IF SOMETHING AWFUL MIGHT HAPPEN: SEVERAL DAYS
3. WORRYING TOO MUCH ABOUT DIFFERENT THINGS: MORE THAN HALF THE DAYS
1. FEELING NERVOUS, ANXIOUS, OR ON EDGE: NEARLY EVERY DAY
GAD7 TOTAL SCORE: 13
2. NOT BEING ABLE TO STOP OR CONTROL WORRYING: MORE THAN HALF THE DAYS
GAD7 TOTAL SCORE: 13

## 2023-07-07 ASSESSMENT — ENCOUNTER SYMPTOMS
FEVER: 0
PARESTHESIAS: 0
PALPITATIONS: 0
PALPITATIONS: 1
DIZZINESS: 0
DIARRHEA: 0
HEARTBURN: 0
CHILLS: 0
WEAKNESS: 0
DYSURIA: 0
HEMATURIA: 0
FREQUENCY: 0
JOINT SWELLING: 0
ABDOMINAL PAIN: 0
ARTHRALGIAS: 0
COUGH: 0
SORE THROAT: 0
NERVOUS/ANXIOUS: 1
MYALGIAS: 0
HEADACHES: 0
EYE ITCHING: 1
NAUSEA: 0
CONSTIPATION: 0
HEMATOCHEZIA: 0
SHORTNESS OF BREATH: 0
EYE PAIN: 0

## 2023-07-07 ASSESSMENT — PAIN SCALES - GENERAL: PAINLEVEL: NO PAIN (0)

## 2023-07-07 ASSESSMENT — PATIENT HEALTH QUESTIONNAIRE - PHQ9: 5. POOR APPETITE OR OVEREATING: NEARLY EVERY DAY

## 2023-07-07 NOTE — PROGRESS NOTES
SUBJECTIVE:   CC: Jono is an 47 year old who presents for preventative health visit.   Healthy Habits:     Getting at least 3 servings of Calcium per day:  Yes    Bi-annual eye exam:  Yes    Dental care twice a year:  Yes    Sleep apnea or symptoms of sleep apnea:  None    Diet:  Regular (no restrictions)    Frequency of exercise:  2-3 days/week    Duration of exercise:  15-30 minutes    Taking medications regularly:  Yes    Medication side effects:  None    Additional concerns today:  Yes      Today's PHQ-2 Score:       7/7/2023     1:08 PM   PHQ-2 ( 1999 [employer])   Q1: Little interest or pleasure in doing things 0   Q2: Feeling down, depressed or hopeless 0   PHQ-2 Score 0   Q1: Little interest or pleasure in doing things Not at all   Q2: Feeling down, depressed or hopeless Not at all   PHQ-2 Score 0       Have you ever done Advance Care Planning? (For example, a Health Directive, POLST, or a discussion with a medical provider or your loved ones about your wishes): No, advance care planning information given to patient to review.  Patient plans to discuss their wishes with loved ones or provider.      Social History     Tobacco Use     Smoking status: Never     Smokeless tobacco: Never   Substance Use Topics     Alcohol use: Yes     Comment: occasional             7/7/2023     1:08 PM   Alcohol Use   Prescreen: >3 drinks/day or >7 drinks/week? No          No data to display                Last PSA: No results found for: PSA    Reviewed orders with patient. Reviewed health maintenance and updated orders accordingly - Yes  Lab work is in process  Labs reviewed in EPIC  BP Readings from Last 3 Encounters:   07/07/23 129/83   04/08/17 (!) 126/94   10/20/14 120/80    Wt Readings from Last 3 Encounters:   07/07/23 85.7 kg (189 lb)   04/08/17 79.4 kg (175 lb)   10/20/14 81.6 kg (180 lb)                  Patient Active Problem List   Diagnosis     Allergic rhinitis     Dysfunction of right eustachian tube     Jermain  syndrome     Hypercholesteremia     Impaired fasting glucose     Irritable bowel syndrome with diarrhea     Kidney stone     Leg pain, anterior, right     Lichen planus     Vitamin D deficiency     History reviewed. No pertinent surgical history.    Social History     Tobacco Use     Smoking status: Never     Smokeless tobacco: Never   Substance Use Topics     Alcohol use: Yes     Comment: occasional     Family History   Problem Relation Age of Onset     Hyperlipidemia Father      Heart Disease Other         Paternal uncles     Glaucoma No family hx of      Macular Degeneration No family hx of      Diabetes No family hx of      Cancer No family hx of          Current Outpatient Medications   Medication Sig Dispense Refill     busPIRone (BUSPAR) 5 MG tablet 1 tablet twice a day as needed for anxiety 30 tablet 0     Allergies   Allergen Reactions     Grass      Other Environmental Allergy      Cockroaches     Ragweeds      Seasonal Allergies      Recent Labs   Lab Test 07/07/23  1404 04/08/17  0620   A1C 5.3  --    *  --    HDL 52  --    TRIG 182*  --    ALT 18 25   CR 0.92 0.90   GFRESTIMATED >90 >90  Non African American GFR Calc     GFRESTBLACK  --  >90   GFR Calc     POTASSIUM 4.4 4.0   TSH 1.01  --         Reviewed and updated as needed this visit by clinical staff   Tobacco  Allergies  Meds  Problems   Surg Hx  Fam Hx  Soc Hx        Reviewed and updated as needed this visit by Provider      Problems   Surg Hx  Fam Hx         Past Medical History:   Diagnosis Date     Kidney stone       History reviewed. No pertinent surgical history.    Review of Systems   Constitutional: Negative for chills and fever.   HENT: Positive for mouth sores (On the right hard palate was told was lichen planus had a biopsy 6 years ago was benign declines any repeat biopsy.). Negative for congestion, ear pain, hearing loss and sore throat.         History of multiple allergies.  Ragweed.  Take Zyrtec  "daily.  He has itchy eyes he has tried Patanol eyedrops without much help.   Eyes: Positive for itching (Prescribe steroids drops per ophthalmology.). Negative for pain and visual disturbance.   Respiratory: Negative for cough and shortness of breath.    Cardiovascular: Positive for palpitations. Negative for chest pain and peripheral edema.        Atypical chest pain symptoms.  Wakes up at night with chest pain left-sided.  Has associated palpitations and anxiety.  Stress from work.   Gastrointestinal: Negative for abdominal pain, constipation, diarrhea, heartburn, hematochezia and nausea.        History of IBS with diarrhea seems to be quiescent now.   Genitourinary: Negative for dysuria, frequency, genital sores, hematuria, impotence, penile discharge and urgency.   Musculoskeletal: Negative for arthralgias, joint swelling and myalgias.   Skin: Negative for rash.   Neurological: Negative for dizziness, weakness, headaches and paresthesias.   Psychiatric/Behavioral: Negative for mood changes. The patient is nervous/anxious.        OBJECTIVE:   /83 (BP Location: Right arm, Patient Position: Sitting, Cuff Size: Adult Regular)   Pulse 73   Temp 97.8  F (36.6  C) (Oral)   Resp 16   Ht 1.833 m (6' 0.15\")   Wt 85.7 kg (189 lb)   SpO2 98%   BMI 25.53 kg/m      Physical Exam  GENERAL: healthy, alert and no distress  EYES: Eyes grossly normal to inspection, PERRL and conjunctivae and sclerae normal, minimal conjunctival injection is a red streak in the right lateral conjunctiva.  HENT: ear canals and TM's normal, nose and mouth without ulcers or lesions oral you serpiginous like white patches with minimal underlying erythema on the right hard palate  NECK: no adenopathy, no asymmetry, masses, or scars and thyroid normal to palpation  RESP: lungs clear to auscultation - no rales, rhonchi or wheezes  CV: regular rate and rhythm, normal S1 S2, no S3 or S4, no murmur, click or rub, no peripheral edema and " peripheral pulses strong  ABDOMEN: soft, nontender, no hepatosplenomegaly, no masses and bowel sounds normal  MS: no gross musculoskeletal defects noted, no edema  SKIN: no suspicious lesions or rashes  NEURO: Normal strength and tone, mentation intact and speech normal  PSYCH: mentation appears normal, affect normal/bright  LYMPH: no cervical, supraclavicular, axillary, or inguinal adenopathy    Diagnostic Test Results:  Labs reviewed in Epic    ASSESSMENT/PLAN:   Jono was seen today for physical, referral and lab request.    Diagnoses and all orders for this visit:    Routine history and physical examination of adult  -     CBC with platelets  -     Comprehensive metabolic panel (BMP + Alb, Alk Phos, ALT, AST, Total. Bili, TP)    Screen for colon cancer  -     Colonoscopy Screening  Referral; Future    Screening for HIV (human immunodeficiency virus)    Need for hepatitis C screening test    Hypercholesteremia  -     Lipid panel reflex to direct LDL Non-fasting    Irritable bowel syndrome with diarrhea  Comments:  Stable, no active symptoms    Lichen planus    Screening for diabetes mellitus  -     Hemoglobin A1c    Anxiety  -     busPIRone (BUSPAR) 5 MG tablet; 1 tablet twice a day as needed for anxiety    Atypical chest pain  -     EKG 12-lead complete w/read - Clinics    Palpitations  -     TSH with free T4 reflex    Other orders  -     REVIEW OF HEALTH MAINTENANCE PROTOCOL ORDERS      Has some anxiety issues stress from work ,wakes up with some atypical chest symptoms, chest tightness and palpitations.  Drinks coffee 1-2 a day.  We will check EKG,  Check basic labs including thyroid panel, chemistry panel, CBC.  BuSpar as needed for anxiety panic attacks.  Discussed side effects of medication can take 5 mg once or twice a day as needed for anxiety  Increase exercise activities ,denies any chest pain with exertion.  No known family history of heart disease at a young age.  We will follow-up in next  couple weeks, 4 to 6 weeks to check on medication if is helping with his anxiety/panic suspected attacks.  For the lesion on the roof of his right sided hard palate, was told was lichen planus had a biopsy done 6 years ago was told benign.  He has seen his dentist a week ago and was reassured.  He does not look forward to another biopsy.  As for his irritable bowel syndrome seems quiescent for now denies any diarrhea.  He was told he has elevated lipid panel that he is watching his diet for.  Declines HIV hepatitis C screen today.  Reports that HIV screen done in the past was negative.  All questions answered and agrees to colonoscopy.  We will check some basic preventative labs as well.    All questions answered.  Patient has been advised of split billing requirements and indicates understanding: Yes      COUNSELING:   Reviewed preventive health counseling, as reflected in patient instructions       Regular exercise       Healthy diet/nutrition       Vision screening       Hearing screening       Aspirin prophylaxis        Alcohol Use        Family planning       Safe sex practices/STD prevention       Colorectal cancer screening       Prostate cancer screening       Advance Care Planning        He reports that he has never smoked. He has never used smokeless tobacco.            Clyde Moore MD  [facility]

## 2023-07-08 DIAGNOSIS — E78.5 HYPERLIPIDEMIA LDL GOAL <100: Primary | ICD-10-CM

## 2023-07-08 RX ORDER — ATORVASTATIN CALCIUM 40 MG/1
40 TABLET, FILM COATED ORAL DAILY
Qty: 90 TABLET | Refills: 0 | Status: SHIPPED | OUTPATIENT
Start: 2023-07-08 | End: 2023-10-05

## 2023-07-08 NOTE — RESULT ENCOUNTER NOTE
López De La Cruz , it was very nice meeting you in the clinic, reviewed your labs,    Cholesterol panel shows elevated LDL [LDL the bad cholesterol] LDL is 193 goal is less than 100, and triglyceride is 182 goal less than 150, HDL the good cholesterol is at goal and is considered athero-protective to the heart.    As per guidelines indications for starting cholesterol medications as LDL is greater than 190, I recommend you start on atorvastatin 40 mg 1 tablet once daily at bedtime, that will help lower the LDL, will send prescription to the pharmacy on records.  Statins may cause muscle aches if any such occur please let me know.  We will repeat your cholesterol panel fasting in next 3 months.  Continue with lifestyle changes; healthy diet low-fat low-cholesterol diet.    Chemistry shows normal electrolytes, normal kidney function test, normal liver enzymes called AST ALT, normal calcium level, normal electrolytes ;sodium and potassium,    Thyroid test called TSH is normal which is reassuring,    CBC shows normal blood counts,     Please call the lab to schedule repeat lipid panel fasting in 3 months.    Any further questions please let me know,    Regards,  Dr Moore

## 2023-07-10 ENCOUNTER — TELEPHONE (OUTPATIENT)
Dept: FAMILY MEDICINE | Facility: CLINIC | Age: 48
End: 2023-07-10
Payer: COMMERCIAL

## 2023-07-10 NOTE — TELEPHONE ENCOUNTER
Clyde Moore MD  [facility] Triage   López De La Cruz , it was very nice meeting you in the clinic, reviewed your labs,     Cholesterol panel shows elevated LDL [LDL the bad cholesterol] LDL is 193 goal is less than 100, and triglyceride is 182 goal less than 150, HDL the good cholesterol is at goal and is considered athero-protective to the heart.     As per guidelines indications for starting cholesterol medications as LDL is greater than 190, I recommend you start on atorvastatin 40 mg 1 tablet once daily at bedtime, that will help lower the LDL, will send prescription to the pharmacy on records.   Statins may cause muscle aches if any such occur please let me know.   We will repeat your cholesterol panel fasting in next 3 months.   Continue with lifestyle changes; healthy diet low-fat low-cholesterol diet.     Chemistry shows normal electrolytes, normal kidney function test, normal liver enzymes called AST ALT, normal calcium level, normal electrolytes ;sodium and potassium,     Thyroid test called TSH is normal which is reassuring,     CBC shows normal blood counts,     Please call the lab to schedule repeat lipid panel fasting in 3 months.     Any further questions please let me know,     Regards,   Dr Moore

## 2023-07-10 NOTE — TELEPHONE ENCOUNTER
Patient Contact    Attempt # 1    Was call answered? No.    Left message for patient to call triage back.    Sonia Martini RN

## 2023-08-26 ENCOUNTER — MYC REFILL (OUTPATIENT)
Dept: FAMILY MEDICINE | Facility: CLINIC | Age: 48
End: 2023-08-26
Payer: COMMERCIAL

## 2023-08-26 DIAGNOSIS — F41.9 ANXIETY: ICD-10-CM

## 2023-08-28 RX ORDER — BUSPIRONE HYDROCHLORIDE 5 MG/1
TABLET ORAL
Qty: 60 TABLET | Refills: 0 | Status: SHIPPED | OUTPATIENT
Start: 2023-08-28 | End: 2023-09-18

## 2023-09-18 ENCOUNTER — MYC REFILL (OUTPATIENT)
Dept: FAMILY MEDICINE | Facility: CLINIC | Age: 48
End: 2023-09-18
Payer: COMMERCIAL

## 2023-09-18 DIAGNOSIS — F41.9 ANXIETY: ICD-10-CM

## 2023-09-19 RX ORDER — BUSPIRONE HYDROCHLORIDE 5 MG/1
TABLET ORAL
Qty: 60 TABLET | Refills: 0 | Status: SHIPPED | OUTPATIENT
Start: 2023-09-19 | End: 2023-10-05

## 2023-09-19 NOTE — TELEPHONE ENCOUNTER
Prescription approved per [facility] Refill Protocol.  Vidhi Lazcano, RN  [facility] Triage Nurse

## 2023-09-21 ENCOUNTER — TELEPHONE (OUTPATIENT)
Dept: GASTROENTEROLOGY | Facility: CLINIC | Age: 48
End: 2023-09-21
Payer: COMMERCIAL

## 2023-09-21 NOTE — TELEPHONE ENCOUNTER
"Endoscopy Scheduling Screen    Have you had a positive Covid test in the last 14 days?  No    Are you active on MyChart?   Yes    What insurance is in the chart?  Other:  R    Ordering/Referring Provider: Oscar   (If ordering provider performs procedure, schedule with ordering provider unless otherwise instructed. )    BMI: Estimated body mass index is 25.53 kg/m  as calculated from the following:    Height as of 7/7/23: 1.833 m (6' 0.15\").    Weight as of 7/7/23: 85.7 kg (189 lb).     Sedation Ordered  moderate sedation.   If patient BMI > 50 do not schedule in [facility].    If patient BMI > 45 do not schedule at [facility].    Are you taking methadone or Suboxone?  No    Are you taking any prescription medications for pain 3 or more times per week?   No    Do you have a history of malignant hyperthermia or adverse reaction to anesthesia?  No    (Females) Are you currently pregnant?   No     Have you been diagnosed or told you have pulmonary hypertension?   No    Do you have an LVAD?  No    Have you been told you have moderate to severe sleep apnea?  No    Have you been told you have COPD, asthma, or any other lung disease?  No    Do you have any heart conditions?  No     Have you ever had an organ transplant?   No    Have you ever had or are you awaiting a heart or lung transplant?   No    Have you had a stroke or transient ischemic attack (TIA aka \"mini stroke\" in the last 6 months?   No    Have you been diagnosed with or been told you have cirrhosis of the liver?   No    Are you currently on dialysis?   No    Do you need assistance transferring?   No    BMI: Estimated body mass index is 25.53 kg/m  as calculated from the following:    Height as of 7/7/23: 1.833 m (6' 0.15\").    Weight as of 7/7/23: 85.7 kg (189 lb).     Is patients BMI > 40 and scheduling location [facility]?  No    Do you take an injectable medication for weight loss or diabetes (excluding insulin)?  No    Do you take the medication Naltrexone?  No    Do you " take blood thinners?  No       Prep   Are you currently on dialysis or do you have chronic kidney disease?  No    Do you have a diagnosis of diabetes?  No    Do you have a diagnosis of cystic fibrosis (CF)?  No    On a regular basis do you go 3 -5 days between bowel movements?  No    BMI > 40?  No    Preferred Pharmacy:    [id number] - [facility] [address] AT [facility] [address]  [facility] [address]  Phone: [phone] Fax: [phone]      Final Scheduling Details   Colonoscopy prep sent?  Standard MiraLAX    Procedure scheduled  Colonoscopy    Surgeon:  Christy     Date of procedure:  10/31/23     Pre-OP / PAC:   No - Not required for this site.    Location  [facility] - Patient preference.    Sedation   Moderate Sedation - Per order.      Patient Reminders:   You will receive a call from a Nurse to review instructions and health history.  This assessment must be completed prior to your procedure.  Failure to complete the Nurse assessment may result in the procedure being cancelled.      On the day of your procedure, please designate an adult(s) who can drive you home stay with you for the next 24 hours. The medicines used in the exam will make you sleepy. You will not be able to drive.      You cannot take public transportation, ride share services, or non-medical taxi service without a responsible caregiver.  Medical transport services are allowed with the requirement that a responsible caregiver will receive you at your destination.  We require that drivers and caregivers are confirmed prior to your procedure.

## 2023-10-05 ENCOUNTER — MYC REFILL (OUTPATIENT)
Dept: FAMILY MEDICINE | Facility: CLINIC | Age: 48
End: 2023-10-05
Payer: COMMERCIAL

## 2023-10-05 DIAGNOSIS — E78.5 HYPERLIPIDEMIA LDL GOAL <100: ICD-10-CM

## 2023-10-05 DIAGNOSIS — F41.9 ANXIETY: ICD-10-CM

## 2023-10-06 RX ORDER — BUSPIRONE HYDROCHLORIDE 5 MG/1
TABLET ORAL
Qty: 60 TABLET | Refills: 0 | Status: SHIPPED | OUTPATIENT
Start: 2023-10-06 | End: 2023-11-06

## 2023-10-06 RX ORDER — ATORVASTATIN CALCIUM 40 MG/1
40 TABLET, FILM COATED ORAL DAILY
Qty: 90 TABLET | Refills: 0 | Status: SHIPPED | OUTPATIENT
Start: 2023-10-06 | End: 2023-12-26

## 2023-10-31 ENCOUNTER — HOSPITAL ENCOUNTER (OUTPATIENT)
Facility: CLINIC | Age: 48
Discharge: HOME | End: 2023-10-31
Attending: COLON & RECTAL SURGERY | Admitting: COLON & RECTAL SURGERY
Payer: COMMERCIAL

## 2023-10-31 VITALS
RESPIRATION RATE: 34 BRPM | DIASTOLIC BLOOD PRESSURE: 80 MMHG | OXYGEN SATURATION: 97 % | SYSTOLIC BLOOD PRESSURE: 102 MMHG | HEART RATE: 67 BPM

## 2023-10-31 LAB — COLONOSCOPY: NORMAL

## 2023-10-31 PROCEDURE — G0121 COLON CA SCRN NOT HI RSK IND: HCPCS | Performed by: COLON & RECTAL SURGERY

## 2023-10-31 PROCEDURE — 45378 DIAGNOSTIC COLONOSCOPY: CPT | Performed by: COLON & RECTAL SURGERY

## 2023-10-31 PROCEDURE — 250N000011 HC RX IP 250 OP 636: Performed by: COLON & RECTAL SURGERY

## 2023-10-31 PROCEDURE — G0500 MOD SEDAT ENDO SERVICE >5YRS: HCPCS | Performed by: COLON & RECTAL SURGERY

## 2023-10-31 PROCEDURE — 250N000013 HC RX MED GY IP 250 OP 250 PS 637: Performed by: COLON & RECTAL SURGERY

## 2023-10-31 PROCEDURE — 99153 MOD SED SAME PHYS/QHP EA: CPT | Performed by: COLON & RECTAL SURGERY

## 2023-10-31 RX ORDER — NALOXONE HYDROCHLORIDE 0.4 MG/ML
0.4 INJECTION, SOLUTION INTRAMUSCULAR; INTRAVENOUS; SUBCUTANEOUS
Status: CANCELLED | OUTPATIENT
Start: 2023-10-31

## 2023-10-31 RX ORDER — ONDANSETRON 2 MG/ML
4 INJECTION INTRAMUSCULAR; INTRAVENOUS
Status: DISCONTINUED | OUTPATIENT
Start: 2023-10-31 | End: 2023-10-31 | Stop reason: HOSPADM

## 2023-10-31 RX ORDER — FLUMAZENIL 0.1 MG/ML
0.2 INJECTION, SOLUTION INTRAVENOUS
Status: CANCELLED | OUTPATIENT
Start: 2023-10-31 | End: 2023-10-31

## 2023-10-31 RX ORDER — PROCHLORPERAZINE MALEATE 10 MG
10 TABLET ORAL EVERY 6 HOURS PRN
Status: CANCELLED | OUTPATIENT
Start: 2023-10-31

## 2023-10-31 RX ORDER — ONDANSETRON 2 MG/ML
4 INJECTION INTRAMUSCULAR; INTRAVENOUS EVERY 6 HOURS PRN
Status: CANCELLED | OUTPATIENT
Start: 2023-10-31

## 2023-10-31 RX ORDER — LIDOCAINE 40 MG/G
CREAM TOPICAL
Status: DISCONTINUED | OUTPATIENT
Start: 2023-10-31 | End: 2023-10-31 | Stop reason: HOSPADM

## 2023-10-31 RX ORDER — NALOXONE HYDROCHLORIDE 0.4 MG/ML
0.2 INJECTION, SOLUTION INTRAMUSCULAR; INTRAVENOUS; SUBCUTANEOUS
Status: CANCELLED | OUTPATIENT
Start: 2023-10-31

## 2023-10-31 RX ORDER — ONDANSETRON 4 MG/1
4 TABLET, ORALLY DISINTEGRATING ORAL EVERY 6 HOURS PRN
Status: CANCELLED | OUTPATIENT
Start: 2023-10-31

## 2023-10-31 RX ORDER — SIMETHICONE 40MG/0.6ML
SUSPENSION, DROPS(FINAL DOSAGE FORM)(ML) ORAL PRN
Status: DISCONTINUED | OUTPATIENT
Start: 2023-10-31 | End: 2023-10-31 | Stop reason: HOSPADM

## 2023-10-31 RX ORDER — FENTANYL CITRATE 50 UG/ML
INJECTION, SOLUTION INTRAMUSCULAR; INTRAVENOUS PRN
Status: DISCONTINUED | OUTPATIENT
Start: 2023-10-31 | End: 2023-10-31 | Stop reason: HOSPADM

## 2023-10-31 ASSESSMENT — ACTIVITIES OF DAILY LIVING (ADL): ADLS_ACUITY_SCORE: 33

## 2023-10-31 NOTE — H&P
Pre-Endoscopy History and Physical   Jono Hensley MRN# [id number]   YOB: 1975 Age: 47 year old   Date of Procedure: 10/31/2023   Primary care provider: Donna Olmedo   Type of Endoscopy: colonoscopy   Reason for Procedure: screening   Type of Anesthesia Anticipated: Moderate Sedation   HPI:   Jono is a 47 year old male for screening colonoscopy.  He has never had a colonoscopy before.  He denies BRBPR, abdominal pain, nausea/vomiting, changes in bowel habits or unintentional weight loss.  He denies a FH of CRC.    Allergies   Allergen Reactions    Grass     Other Environmental Allergy      Cockroaches    Ragweeds     Seasonal Allergies       Prior to Admission Medications   Prescriptions Last Dose Informant Patient Reported? Taking?   atorvastatin (LIPITOR) 40 MG tablet Past Week  No Yes   Sig: Take 1 tablet (40 mg) by mouth daily   busPIRone (BUSPAR) 5 MG tablet Past Week  No Yes   Si.5 tablet twice a day as needed for anxiety      Facility-Administered Medications: None      Patient Active Problem List   Diagnosis    Allergic rhinitis    Dysfunction of right eustachian tube    Gilbert syndrome    Hypercholesteremia    Impaired fasting glucose    Irritable bowel syndrome with diarrhea    Kidney stone    Leg pain, anterior, right    Lichen planus    Vitamin D deficiency      Past Medical History:   Diagnosis Date    Kidney stone       History reviewed. No pertinent surgical history.   Social History     Tobacco Use    Smoking status: Never    Smokeless tobacco: Never   Substance Use Topics    Alcohol use: Yes     Comment: occasional      Family History   Problem Relation Age of Onset    Hyperlipidemia Father     Heart Disease Other         Paternal uncles    Glaucoma No family hx of     Macular Degeneration No family hx of     Diabetes No family hx of     Cancer No family hx of       PHYSICAL EXAM:   /85   Pulse 65   Resp 15   SpO2 99%  Estimated body mass index  "is 25.53 kg/m  as calculated from the following:    Height as of 7/7/23: 1.833 m (6' 0.15\").    Weight as of 7/7/23: 85.7 kg (189 lb).   RESP: lungs clear to auscultation - no rales, rhonchi or wheezes   CV: regular rates and rhythm   ASA Class 2 - Mild systemic disease    Assessment: 48 y/o gentleman for screening colonoscopy    Plan: Colonoscopy with moderate sedation.  Risks of the procedure were discussed including, but not limited to, bleeding, perforation and missed lesions.  Patient understands and is willing to proceed.    Ismael Harrison MD ....................  10/31/2023   9:31 AM  Colon and Rectal Surgery Staff  [phone]     "

## 2023-11-06 ENCOUNTER — MYC REFILL (OUTPATIENT)
Dept: FAMILY MEDICINE | Facility: CLINIC | Age: 48
End: 2023-11-06
Payer: COMMERCIAL

## 2023-11-06 DIAGNOSIS — F41.9 ANXIETY: ICD-10-CM

## 2023-11-06 RX ORDER — BUSPIRONE HYDROCHLORIDE 5 MG/1
TABLET ORAL
Qty: 60 TABLET | Refills: 1 | Status: SHIPPED | OUTPATIENT
Start: 2023-11-06

## 2023-12-01 ENCOUNTER — VIRTUAL VISIT (OUTPATIENT)
Dept: FAMILY MEDICINE | Facility: CLINIC | Age: 48
End: 2023-12-01
Payer: COMMERCIAL

## 2023-12-01 DIAGNOSIS — F41.9 ANXIETY: Primary | ICD-10-CM

## 2023-12-01 DIAGNOSIS — G47.00 INSOMNIA, UNSPECIFIED TYPE: ICD-10-CM

## 2023-12-01 DIAGNOSIS — F41.0 PANIC ATTACK: ICD-10-CM

## 2023-12-01 PROCEDURE — 99214 OFFICE O/P EST MOD 30 MIN: CPT | Mod: VID | Performed by: INTERNAL MEDICINE

## 2023-12-01 RX ORDER — BUSPIRONE HYDROCHLORIDE 10 MG/1
10 TABLET ORAL 3 TIMES DAILY PRN
Qty: 90 TABLET | Refills: 0 | Status: SHIPPED | OUTPATIENT
Start: 2023-12-01 | End: 2024-01-05

## 2023-12-01 RX ORDER — ESCITALOPRAM OXALATE 10 MG/1
10 TABLET ORAL DAILY
Qty: 90 TABLET | Refills: 0 | Status: SHIPPED | OUTPATIENT
Start: 2023-12-01 | End: 2024-03-06

## 2023-12-01 RX ORDER — LORAZEPAM 0.5 MG/1
TABLET ORAL
Qty: 10 TABLET | Refills: 0 | Status: SHIPPED | OUTPATIENT
Start: 2023-12-01 | End: 2023-12-25

## 2023-12-01 ASSESSMENT — ANXIETY QUESTIONNAIRES
1. FEELING NERVOUS, ANXIOUS, OR ON EDGE: NEARLY EVERY DAY
GAD7 TOTAL SCORE: 20
IF YOU CHECKED OFF ANY PROBLEMS ON THIS QUESTIONNAIRE, HOW DIFFICULT HAVE THESE PROBLEMS MADE IT FOR YOU TO DO YOUR WORK, TAKE CARE OF THINGS AT HOME, OR GET ALONG WITH OTHER PEOPLE: EXTREMELY DIFFICULT
7. FEELING AFRAID AS IF SOMETHING AWFUL MIGHT HAPPEN: NEARLY EVERY DAY
3. WORRYING TOO MUCH ABOUT DIFFERENT THINGS: NEARLY EVERY DAY
2. NOT BEING ABLE TO STOP OR CONTROL WORRYING: NEARLY EVERY DAY
5. BEING SO RESTLESS THAT IT IS HARD TO SIT STILL: NEARLY EVERY DAY
GAD7 TOTAL SCORE: 20
8. IF YOU CHECKED OFF ANY PROBLEMS, HOW DIFFICULT HAVE THESE MADE IT FOR YOU TO DO YOUR WORK, TAKE CARE OF THINGS AT HOME, OR GET ALONG WITH OTHER PEOPLE?: EXTREMELY DIFFICULT
7. FEELING AFRAID AS IF SOMETHING AWFUL MIGHT HAPPEN: NEARLY EVERY DAY
4. TROUBLE RELAXING: NEARLY EVERY DAY
GAD7 TOTAL SCORE: 20
6. BECOMING EASILY ANNOYED OR IRRITABLE: MORE THAN HALF THE DAYS

## 2023-12-01 NOTE — PROGRESS NOTES
"Jono is a 47 year old who is being evaluated via a billable video visit.      How would you like to obtain your AVS? MyChart  If the video visit is dropped, the invitation should be resent by: Text to cell phone: [phone]  Will anyone else be joining your video visit? No          Assessment & Plan   Problem List Items Addressed This Visit    None  Visit Diagnoses       Anxiety    -  Primary    Describes some chest fluttering sensation probably anxiety related.    Relevant Medications    escitalopram (LEXAPRO) 10 MG tablet    busPIRone (BUSPAR) 10 MG tablet    LORazepam (ATIVAN) 0.5 MG tablet    Insomnia, unspecified type        Relevant Medications    LORazepam (ATIVAN) 0.5 MG tablet    Panic attack        Relevant Medications    escitalopram (LEXAPRO) 10 MG tablet    busPIRone (BUSPAR) 10 MG tablet    LORazepam (ATIVAN) 0.5 MG tablet        Refer to counseling  Start on SSRI will start Lexapro 10 mg.  Will give a small course of lorazepam 0.5 mg once daily at bedtime to help him sleep has not been sleeping well due to mind racing, and as a bridge to SSRI becomes effective.  Also increased dose of buspirone to 10 mg up to 3 times a day as needed do not take with lorazepam.  He was taking BuSpar 7.5 mg twice daily without much success in treating his anxiety.  Patient in agreement with plan follow-up in 2 weeks and as needed.  Again as mentioned no suicidal ideations or symptoms of depression.  Encouraged to continue exercise.  He does get 3 to 4 hours sleep and he does not feel fatigued or somnolent next day.  Reviewed labs all questions answered         BMI:   Estimated body mass index is 25.53 kg/m  as calculated from the following:    Height as of 7/7/23: 1.833 m (6' 0.15\").    Weight as of 7/7/23: 85.7 kg (189 lb).   Weight management plan: Discussed healthy diet and exercise guidelines    Work on weight loss  Regular exercise  See Patient Instructions    Clyde Moore MD  [facility] " ALEX Heller   Jono is a 47 year old, presenting for the following health issues:  Anxiety         No data to display                HPI     Patient presenting for evaluation follow-up on anxiety.  His anxiety is much exacerbated recently.  Contact feeling fluttering in his chest.  He reports that his anxiety kicks in first before he starts feeling a fluttering sensation.  No dyspnea or chest pain per se.  He has not been exercising much due to work.  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  He drinks alcohol occasionally and does not smoke.  He lives with his girlfriend was very supportive of him.  He does walking for exercise.  No other systemic complaints.  Denies being depressed    Review of Systems   Constitutional, HEENT, cardiovascular, pulmonary, gi and gu systems are negative, except as otherwise noted.      Objective           Vitals:  No vitals were obtained today due to virtual visit.    Physical Exam   GENERAL: Healthy, alert and no distress  EYES: Eyes grossly normal to inspection.  No discharge or erythema, or obvious scleral/conjunctival abnormalities.  RESP: No audible wheeze, cough, or visible cyanosis.  No visible retractions or increased work of breathing.    SKIN: Visible skin clear. No significant rash, abnormal pigmentation or lesions.  NEURO: Cranial nerves grossly intact.  Mentation and speech appropriate for age.  PSYCH: Mentation appears normal, affect normal/bright, judgement and insight intact, normal speech and appearance well-groomed.    Admission on 10/31/2023, Discharged on 10/31/2023   Component Date Value Ref Range Status    COLONOSCOPY 10/31/2023    Final                    Value:[address] [place]  [address]  _______________________________________________________________________________  Patient Name: Jono Hensley         Procedure Date: 10/31/2023 9:04  PERRY  MRN: [id number]                       Account Number: [id number]  YOB: 1975             Admit Type: Outpatient  Age: 47                               Room: [address] [address]  Note Status: Finalized                Attending MD: HELENA BYRD , ,   Total Sedation Time: 26 mins of continuous bedside 1:1 Instrument Name: 525   [id number] Adult Colon  _______________________________________________________________________________     Procedure:                Colonoscopy  Indications:              Screening for colorectal malignant neoplasm  Providers:                HELENA BYRD, Chelo West RN  Referring MD:             DEANNA GRAY MD  Medicines:                Fentanyl 100 micrograms IV, Midazolam 2 mg IV  Complications:            No immediate complicati                          ons. Estimated blood loss:                             None.  _______________________________________________________________________________  Procedure:                Pre-Anesthesia Assessment:                            - Prior to the procedure, a History and Physical                             was performed, and patient medications and                             allergies were reviewed. The patient is competent.                             The risks and benefits of the procedure and the                             sedation options and risks were discussed with the                             patient. All questions were answered and informed                             consent was obtained. Patient identification and                             proposed procedure were verified by the physician                             and the nurse in the procedure room. Mental Status                             Examination: alert and oriented. Airway                             Examination: no                          rmal oropharyngeal airway and neck                             mobility and Mallampati Class I (tonsillar  pillars                             visualized). Respiratory Examination: clear to                             auscultation. CV Examination: RRR, no murmurs, no                             S3 or S4. Prophylactic Antibiotics: The patient                             does not require prophylactic antibiotics. Prior                             Anticoagulants: The patient has taken no                             anticoagulant or antiplatelet agents. ASA Grade                             Assessment: II - A patient with mild systemic                             disease. After reviewing the risks and benefits,                             the patient was deemed in satisfactory condition to                             undergo the procedure. The anesthesia plan was to                             use moderate sedation / analgesia (conscious                             sedation). Immediately pr                          ior to administration of                             medications, the patient was re-assessed for                             adequacy to receive sedatives. The heart rate,                             respiratory rate, oxygen saturations, blood                             pressure, adequacy of pulmonary ventilation, and                             response to care were monitored throughout the                             procedure. The physical status of the patient was                             re-assessed after the procedure.                            After obtaining informed consent, the colonoscope                             was passed under direct vision. Throughout the                             procedure, the patient's blood pressure, pulse, and                             oxygen saturations were monitored continuously. The                             colonoscope 525 was introduced through the anus and                             advanced to the cecum, identified by appendi                           ceal                             orifice and ileocecal valve. The ileocecal valve,                             the appendiceal orifice and the rectum were                             photographed. The colonoscopy was performed without                             difficulty. The patient tolerated the procedure                             well. The quality of the bowel preparation was                             good. Scope withdrawal time was 10 minutes. The                             total duration of the procedure was 16 minutes.                                                                                   Findings:       The digital rectal exam was normal.       The entire examined colon appeared normal on direct and retroflexion        views.                                                                                   Impression:               - The entire examined colon is normal on direct and                             retroflexion views.                            - No                           specimens collected.  Recommendation:           - Discharge patient to home (ambulatory).                            - Repeat colonoscopy in 10 years for screening                             purposes.                                                                                   Procedure Code(s):       --- Professional ---       51666, Colonoscopy, flexible; diagnostic, including collection of        specimen(s) by brushing or washing, when performed (separate procedure)  Diagnosis Code(s):       --- Professional ---       Z12.11, Encounter for screening for malignant neoplasm of colon    CPT copyright 2022 American Medical Association. All rights reserved.    The codes documented in this report are preliminary and upon  review may   be revised to meet current compliance requirements.    Electronic Signature by: Dr. Byrd  ________________  HELENA BYRD,   10/31/2023 9:55:26 AM  I was physically  present for the entire viewing portion of the exam.  HELENA BYRD  Number of Addenda                          : 0    Note Initiated On: 10/31/2023 9:04 AM  Scope Withdrawal Time: 0 hours 10 minutes 8 seconds   Total Procedure Duration: 0 hours 16 minutes 43 seconds   Scope In: 9:36:29 AM  Scope Out: 9:53:12 AM               Video-Visit Details    Type of service:  Video Visit     Originating Location (pt. Location): Home    Distant Location (provider location):  On-site  Platform used for Video Visit: Julee

## 2023-12-25 ENCOUNTER — MYC REFILL (OUTPATIENT)
Dept: FAMILY MEDICINE | Facility: CLINIC | Age: 48
End: 2023-12-25
Payer: COMMERCIAL

## 2023-12-25 DIAGNOSIS — G47.00 INSOMNIA, UNSPECIFIED TYPE: ICD-10-CM

## 2023-12-26 DIAGNOSIS — E78.5 HYPERLIPIDEMIA LDL GOAL <100: ICD-10-CM

## 2023-12-26 RX ORDER — LORAZEPAM 0.5 MG/1
TABLET ORAL
Qty: 10 TABLET | Refills: 0 | Status: SHIPPED | OUTPATIENT
Start: 2023-12-26 | End: 2024-05-14

## 2023-12-26 RX ORDER — ATORVASTATIN CALCIUM 40 MG/1
40 TABLET, FILM COATED ORAL DAILY
Qty: 90 TABLET | Refills: 2 | Status: SHIPPED | OUTPATIENT
Start: 2023-12-26 | End: 2024-05-14

## 2024-01-05 ENCOUNTER — MYC REFILL (OUTPATIENT)
Dept: FAMILY MEDICINE | Facility: CLINIC | Age: 49
End: 2024-01-05
Payer: COMMERCIAL

## 2024-01-05 ENCOUNTER — MYC MEDICAL ADVICE (OUTPATIENT)
Dept: FAMILY MEDICINE | Facility: CLINIC | Age: 49
End: 2024-01-05
Payer: COMMERCIAL

## 2024-01-05 DIAGNOSIS — F41.9 ANXIETY: ICD-10-CM

## 2024-01-05 DIAGNOSIS — E78.5 HYPERLIPIDEMIA LDL GOAL <100: ICD-10-CM

## 2024-01-05 RX ORDER — ATORVASTATIN CALCIUM 40 MG/1
40 TABLET, FILM COATED ORAL DAILY
Qty: 90 TABLET | Refills: 2 | OUTPATIENT
Start: 2024-01-05

## 2024-01-05 RX ORDER — BUSPIRONE HYDROCHLORIDE 10 MG/1
10 TABLET ORAL 3 TIMES DAILY PRN
Qty: 90 TABLET | Refills: 2 | Status: SHIPPED | OUTPATIENT
Start: 2024-01-05 | End: 2024-05-14

## 2024-01-05 RX ORDER — ESCITALOPRAM OXALATE 10 MG/1
10 TABLET ORAL DAILY
Qty: 90 TABLET | Refills: 0 | OUTPATIENT
Start: 2024-01-05

## 2024-03-05 DIAGNOSIS — F41.9 ANXIETY: ICD-10-CM

## 2024-03-06 RX ORDER — ESCITALOPRAM OXALATE 10 MG/1
10 TABLET ORAL DAILY
Qty: 90 TABLET | Refills: 0 | Status: SHIPPED | OUTPATIENT
Start: 2024-03-06 | End: 2024-05-14

## 2024-05-14 ENCOUNTER — MYC REFILL (OUTPATIENT)
Dept: FAMILY MEDICINE | Facility: CLINIC | Age: 49
End: 2024-05-14
Payer: COMMERCIAL

## 2024-05-14 DIAGNOSIS — G47.00 INSOMNIA, UNSPECIFIED TYPE: ICD-10-CM

## 2024-05-14 DIAGNOSIS — E78.5 HYPERLIPIDEMIA LDL GOAL <100: ICD-10-CM

## 2024-05-14 DIAGNOSIS — F41.9 ANXIETY: ICD-10-CM

## 2024-05-15 RX ORDER — ATORVASTATIN CALCIUM 40 MG/1
40 TABLET, FILM COATED ORAL DAILY
Qty: 90 TABLET | Refills: 0 | Status: SHIPPED | OUTPATIENT
Start: 2024-05-15 | End: 2024-08-15

## 2024-05-15 RX ORDER — BUSPIRONE HYDROCHLORIDE 10 MG/1
10 TABLET ORAL 3 TIMES DAILY PRN
Qty: 90 TABLET | Refills: 0 | Status: SHIPPED | OUTPATIENT
Start: 2024-05-15

## 2024-05-15 RX ORDER — LORAZEPAM 0.5 MG/1
TABLET ORAL
Qty: 10 TABLET | Refills: 0 | Status: SHIPPED | OUTPATIENT
Start: 2024-05-15

## 2024-05-15 RX ORDER — ESCITALOPRAM OXALATE 10 MG/1
10 TABLET ORAL DAILY
Qty: 90 TABLET | Refills: 0 | Status: SHIPPED | OUTPATIENT
Start: 2024-05-15 | End: 2024-08-15

## 2024-07-08 ENCOUNTER — OFFICE VISIT (OUTPATIENT)
Dept: FAMILY MEDICINE | Facility: CLINIC | Age: 49
End: 2024-07-08
Payer: COMMERCIAL

## 2024-07-08 VITALS
HEART RATE: 63 BPM | TEMPERATURE: 97.7 F | SYSTOLIC BLOOD PRESSURE: 112 MMHG | BODY MASS INDEX: 25.06 KG/M2 | OXYGEN SATURATION: 97 % | WEIGHT: 185 LBS | HEIGHT: 72 IN | DIASTOLIC BLOOD PRESSURE: 73 MMHG | RESPIRATION RATE: 16 BRPM

## 2024-07-08 DIAGNOSIS — T88.7XXA MEDICATION SIDE EFFECTS: ICD-10-CM

## 2024-07-08 DIAGNOSIS — Z23 NEED FOR DIPHTHERIA-TETANUS-PERTUSSIS (TDAP) VACCINE: ICD-10-CM

## 2024-07-08 DIAGNOSIS — Z00.00 ROUTINE HISTORY AND PHYSICAL EXAMINATION OF ADULT: Primary | ICD-10-CM

## 2024-07-08 DIAGNOSIS — Z13.21 ENCOUNTER FOR VITAMIN DEFICIENCY SCREENING: ICD-10-CM

## 2024-07-08 DIAGNOSIS — F43.23 ADJUSTMENT REACTION WITH ANXIETY AND DEPRESSION: ICD-10-CM

## 2024-07-08 DIAGNOSIS — J30.2 SEASONAL ALLERGIC RHINITIS, UNSPECIFIED TRIGGER: ICD-10-CM

## 2024-07-08 DIAGNOSIS — G47.00 INSOMNIA, UNSPECIFIED TYPE: ICD-10-CM

## 2024-07-08 DIAGNOSIS — Z13.1 SCREENING FOR DIABETES MELLITUS: ICD-10-CM

## 2024-07-08 DIAGNOSIS — E78.5 HYPERLIPIDEMIA LDL GOAL <100: ICD-10-CM

## 2024-07-08 LAB
ALT SERPL W P-5'-P-CCNC: 40 U/L (ref 0–70)
CHOLEST SERPL-MCNC: 158 MG/DL
FASTING STATUS PATIENT QL REPORTED: NO
HBA1C MFR BLD: 5.3 % (ref 0–5.6)
HDLC SERPL-MCNC: 53 MG/DL
LDLC SERPL CALC-MCNC: 80 MG/DL
NONHDLC SERPL-MCNC: 105 MG/DL
TRIGL SERPL-MCNC: 127 MG/DL
VIT D+METAB SERPL-MCNC: 18 NG/ML (ref 20–50)

## 2024-07-08 PROCEDURE — 84460 ALANINE AMINO (ALT) (SGPT): CPT | Performed by: INTERNAL MEDICINE

## 2024-07-08 PROCEDURE — 99214 OFFICE O/P EST MOD 30 MIN: CPT | Mod: 25 | Performed by: INTERNAL MEDICINE

## 2024-07-08 PROCEDURE — 82306 VITAMIN D 25 HYDROXY: CPT | Performed by: INTERNAL MEDICINE

## 2024-07-08 PROCEDURE — 93000 ELECTROCARDIOGRAM COMPLETE: CPT | Performed by: INTERNAL MEDICINE

## 2024-07-08 PROCEDURE — 90471 IMMUNIZATION ADMIN: CPT | Performed by: INTERNAL MEDICINE

## 2024-07-08 PROCEDURE — 80061 LIPID PANEL: CPT | Performed by: INTERNAL MEDICINE

## 2024-07-08 PROCEDURE — 90715 TDAP VACCINE 7 YRS/> IM: CPT | Performed by: INTERNAL MEDICINE

## 2024-07-08 PROCEDURE — 83036 HEMOGLOBIN GLYCOSYLATED A1C: CPT | Performed by: INTERNAL MEDICINE

## 2024-07-08 PROCEDURE — 36415 COLL VENOUS BLD VENIPUNCTURE: CPT | Performed by: INTERNAL MEDICINE

## 2024-07-08 PROCEDURE — 99396 PREV VISIT EST AGE 40-64: CPT | Mod: 25 | Performed by: INTERNAL MEDICINE

## 2024-07-08 RX ORDER — TRAZODONE HYDROCHLORIDE 50 MG/1
TABLET, FILM COATED ORAL
Qty: 30 TABLET | Refills: 1 | Status: SHIPPED | OUTPATIENT
Start: 2024-07-08 | End: 2024-09-05

## 2024-07-08 SDOH — HEALTH STABILITY: PHYSICAL HEALTH: ON AVERAGE, HOW MANY MINUTES DO YOU ENGAGE IN EXERCISE AT THIS LEVEL?: 30 MIN

## 2024-07-08 SDOH — HEALTH STABILITY: PHYSICAL HEALTH: ON AVERAGE, HOW MANY DAYS PER WEEK DO YOU ENGAGE IN MODERATE TO STRENUOUS EXERCISE (LIKE A BRISK WALK)?: 7 DAYS

## 2024-07-08 ASSESSMENT — SOCIAL DETERMINANTS OF HEALTH (SDOH): HOW OFTEN DO YOU GET TOGETHER WITH FRIENDS OR RELATIVES?: ONCE A WEEK

## 2024-07-08 ASSESSMENT — PATIENT HEALTH QUESTIONNAIRE - PHQ9
SUM OF ALL RESPONSES TO PHQ QUESTIONS 1-9: 7
SUM OF ALL RESPONSES TO PHQ QUESTIONS 1-9: 7
10. IF YOU CHECKED OFF ANY PROBLEMS, HOW DIFFICULT HAVE THESE PROBLEMS MADE IT FOR YOU TO DO YOUR WORK, TAKE CARE OF THINGS AT HOME, OR GET ALONG WITH OTHER PEOPLE: SOMEWHAT DIFFICULT

## 2024-07-08 ASSESSMENT — PAIN SCALES - GENERAL: PAINLEVEL: NO PAIN (0)

## 2024-07-08 NOTE — PROGRESS NOTES
Preventive Care Visit  [facility]  Clyde Moore MD, Internal Medicine  Jul 8, 2024      Assessment & Plan   Problem List Items Addressed This Visit       Allergic rhinitis    Hypercholesteremia     Other Visit Diagnoses       Routine history and physical examination of adult    -  Primary    Hyperlipidemia LDL goal <100        Relevant Orders    Lipid panel reflex to direct LDL Non-fasting    ALT    Insomnia, unspecified type        Relevant Medications    traZODone (DESYREL) 50 MG tablet    Other Relevant Orders    Adult Mental Health  Referral    Adult Mental Health  Referral    Adjustment reaction with anxiety and depression        Relevant Medications    traZODone (DESYREL) 50 MG tablet    Other Relevant Orders    Adult Mental Health  Referral    Adult Mental Health  Referral    Screening for diabetes mellitus        Relevant Orders    Hemoglobin A1c    Encounter for vitamin deficiency screening        Relevant Orders    Vitamin D Deficiency    Medication side effects        Relevant Orders    EKG 12-lead complete w/read - Clinics (Completed)             has some ongoing anxiety and sleep related insomnia advised to seek counseling and mental health specialist patient in agreement.  Will add trazodone 50 mg half to 1 tablet daily as.  Wean on cannabis THC use uses Gummies over-the-counter they may have rebound side effects as far as worsening anxiety and sleep issues.  Congratulated patient on weaning on alcohol intake from 1 liquor a day to done yearly.  Continue with exercise activities as tolerated.  .  No allergy exacerbation no ear problems.  He may have had a flare of kidney stone but currently denies any urinary symptoms.  No  symptoms.  Bowels are regular now.  History of IBS with diarrhea but has been more regular is more careful with his diet.  He eats more balanced diet BMI is 24.9  He is tolerating statin repeat lipid panel.  Was getting  conjunctivitis from the workshop he has history of allergies in general uses Zyrtec.  Prevention from allergy triggers no pets at home.    Can use Flonase 2 puffs in each nostril once daily      Patient has been advised of split billing requirements and indicates understanding: Yes       Counseling  Appropriate preventive services were discussed with this patient, including applicable screening as appropriate for fall prevention, nutrition, physical activity, Tobacco-use cessation, weight loss and cognition.  Checklist reviewing preventive services available has been given to the patient.  Reviewed patient's diet, addressing concerns and/or questions.   The patient's PHQ-9 score is consistent with mild depression. He was provided with information regarding depression.     Work on weight loss  Regular exercise  See Patient Instructions      Arron D eLa Cruz is a 48 year old, presenting for the following:  Physical         No data to display                 Health Care Directive  Patient does not have a Health Care Directive or Living Will: Discussed advance care planning with patient; information given to patient to review.    HPI  History of kidney stones could have had a flare since last visit.  Patient exercises 7 times a week.  Continues to struggle with stress and sleep issues    Problem with sleep on a daily basis, mind racing, wakes up in the middle of night sometimes can sleep and sometimes not.  Work-related court cases, he does marketing advertising.  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    Is taking escitalopram 10 mg once daily, buspirone was not helpful, was using once daily as needed still using lorazepam.    THC 5 mg/ CBD 25 at night, gummies, no vaping, to help relax, sleeping is major issues, thoughts are racing     Has a bug bite.      7/8/2024   General Health   How would you rate your overall physical health? Good   Feel stress (tense, anxious, or unable to sleep) Very much       (!) STRESS CONCERN      7/8/2024   Nutrition   Three or more servings of calcium each day? Yes   Diet: Regular (no restrictions)   How many servings of fruit and vegetables per day? (!) 2-3   How many sweetened beverages each day? 0-1            7/8/2024   Exercise   Days per week of moderate/strenous exercise 7 days   Average minutes spent exercising at this level 30 min            7/8/2024   Social Factors   Frequency of gathering with friends or relatives Once a week   Worry food won't last until get money to buy more No   Food not last or not have enough money for food? No   Do you have housing? (Housing is defined as stable permanent housing and does not include staying ouside in a car, in a tent, in an abandoned building, in an overnight shelter, or couch-surfing.) Yes   Are you worried about losing your housing? No   Lack of transportation? No   Unable to get utilities (heat,electricity)? No            7/8/2024   Dental   Dentist two times every year? Yes            7/8/2024   TB Screening   Were you born outside of the [place]? No          Today's PHQ-9 Score:       7/8/2024     9:27 AM   PHQ-9 SCORE   PHQ-9 Total Score MyChart 7 (Mild depression)   PHQ-9 Total Score 7         7/8/2024   Substance Use   Alcohol more than 3/day or more than 7/wk No   Do you use any other substances recreationally? (!) CANNABIS PRODUCTS        Social History     Tobacco Use    Smoking status: Never    Smokeless tobacco: Never   Vaping Use    Vaping status: Never Used   Substance Use Topics    Alcohol use: Yes     Comment: occasional; stopped in January, beer in a month    Drug use: Never             7/8/2024   One time HIV Screening   Previous HIV test? Yes          7/8/2024   STI Screening   New sexual partner(s) since last STI/HIV test? No      ASCVD Risk   The 10-year ASCVD risk score (Kori BASS, et al., 2019) is: 3.9%    Values used to calculate the score:      Age: 48 years      Sex: Male      Is Non-  : No      Diabetic: No      Tobacco smoker: No      Systolic Blood Pressure: 112 mmHg      Is BP treated: No      HDL Cholesterol: 52 mg/dL      Total Cholesterol: 281 mg/dL        7/8/2024   Contraception/Family Planning   Questions about contraception or family planning No           Reviewed and updated as needed this visit by Provider     Meds   Med Hx  Surg Hx  Fam Hx            Past Medical History:   Diagnosis Date    Anxiety and depression     Kidney stone      Past Surgical History:   Procedure Laterality Date    COLONOSCOPY N/A 10/31/2023    Procedure: Colonoscopy;  Surgeon: Ismael Harrison MD;  Location:  GI     Lab work is in process  Labs reviewed in EPIC  BP Readings from Last 3 Encounters:   07/08/24 112/73   10/31/23 102/80   07/07/23 129/83    Wt Readings from Last 3 Encounters:   07/08/24 83.9 kg (185 lb)   07/07/23 85.7 kg (189 lb)   04/08/17 79.4 kg (175 lb)                  Patient Active Problem List   Diagnosis    Allergic rhinitis    Dysfunction of right eustachian tube    Gilbert syndrome    Hypercholesteremia    Impaired fasting glucose    Irritable bowel syndrome with diarrhea    Kidney stone    Leg pain, anterior, right    Lichen planus    Vitamin D deficiency     Past Surgical History:   Procedure Laterality Date    COLONOSCOPY N/A 10/31/2023    Procedure: Colonoscopy;  Surgeon: Ismael Harrison MD;  Location:  GI       Social History     Tobacco Use    Smoking status: Never    Smokeless tobacco: Never   Substance Use Topics    Alcohol use: Yes     Comment: occasional; stopped in January, beer in a month     Family History   Problem Relation Age of Onset    Hyperlipidemia Father     Heart Disease Other         Paternal uncles    Glaucoma No family hx of     Macular Degeneration No family hx of     Diabetes No family hx of     Cancer No family hx of          Current Outpatient Medications   Medication Sig Dispense Refill    atorvastatin (LIPITOR) 40 MG tablet Take  "1 tablet (40 mg) by mouth daily 90 tablet 0    busPIRone (BUSPAR) 10 MG tablet Take 1 tablet (10 mg) by mouth 3 times daily as needed (anxiety) 90 tablet 0    busPIRone (BUSPAR) 5 MG tablet 1.5 tablet twice a day as needed for anxiety 60 tablet 1    escitalopram (LEXAPRO) 10 MG tablet Take 1 tablet (10 mg) by mouth daily 90 tablet 0    LORazepam (ATIVAN) 0.5 MG tablet 1 tablet once daily at bed time as needed for insomnia 10 tablet 0    traZODone (DESYREL) 50 MG tablet Half to 1 tablet at bedtime for sleep/anxiety 30 tablet 1     Allergies   Allergen Reactions    Grass     Other Environmental Allergy      Cockroaches    Ragweeds     Seasonal Allergies      Recent Labs   Lab Test 07/07/23  1404 04/08/17  0620   A1C 5.3  --    *  --    HDL 52  --    TRIG 182*  --    ALT 18 25   CR 0.92 0.90   GFRESTIMATED >90 >90  Non African American GFR Calc     GFRESTBLACK  --  >90   GFR Calc     POTASSIUM 4.4 4.0   TSH 1.01  --           Review of Systems  Constitutional, neuro, ENT, endocrine, pulmonary, cardiac, gastrointestinal, genitourinary, musculoskeletal, integument and psychiatric systems are negative, except as otherwise noted.     Objective    Exam  /73 (BP Location: Right arm, Patient Position: Sitting, Cuff Size: Adult Large)   Pulse 63   Temp 97.7  F (36.5  C) (Temporal)   Resp 16   Ht 1.836 m (6' 0.28\")   Wt 83.9 kg (185 lb)   SpO2 97%   BMI 24.89 kg/m     Estimated body mass index is 24.89 kg/m  as calculated from the following:    Height as of this encounter: 1.836 m (6' 0.28\").    Weight as of this encounter: 83.9 kg (185 lb).    Physical Exam  GENERAL: alert and no distress  EYES: Eyes grossly normal to inspection, PERRL and conjunctivae and sclerae normal  HENT: ear canals and TM's normal, nose and mouth without ulcers or lesions  NECK: no adenopathy, no asymmetry, masses, or scars  RESP: lungs clear to auscultation - no rales, rhonchi or wheezes  CV: regular rate and rhythm, " normal S1 S2, no S3 or S4, no murmur, click or rub, no peripheral edema  ABDOMEN: soft, nontender, no hepatosplenomegaly, no masses and bowel sounds normal  MS: no gross musculoskeletal defects noted, no edema  SKIN: no suspicious lesions or rashes  NEURO: Normal strength and tone, mentation intact and speech normal  PSYCH: mentation appears normal, affect normal/bright        Signed Electronically by: Clyde Moore MD    Answers submitted by the patient for this visit:  Patient Health Questionnaire (Submitted on 7/8/2024)  If you checked off any problems, how difficult have these problems made it for you to do your work, take care of things at home, or get along with other people?: Somewhat difficult  PHQ9 TOTAL SCORE: 7

## 2024-08-15 DIAGNOSIS — F41.9 ANXIETY: ICD-10-CM

## 2024-08-15 DIAGNOSIS — E78.5 HYPERLIPIDEMIA LDL GOAL <100: ICD-10-CM

## 2024-08-15 RX ORDER — ATORVASTATIN CALCIUM 40 MG/1
40 TABLET, FILM COATED ORAL DAILY
Qty: 90 TABLET | Refills: 2 | Status: SHIPPED | OUTPATIENT
Start: 2024-08-15

## 2024-08-15 RX ORDER — ESCITALOPRAM OXALATE 10 MG/1
10 TABLET ORAL DAILY
Qty: 90 TABLET | Refills: 0 | Status: SHIPPED | OUTPATIENT
Start: 2024-08-15

## 2024-09-05 DIAGNOSIS — G47.00 INSOMNIA, UNSPECIFIED TYPE: ICD-10-CM

## 2024-09-05 DIAGNOSIS — F43.23 ADJUSTMENT REACTION WITH ANXIETY AND DEPRESSION: ICD-10-CM

## 2024-09-05 RX ORDER — TRAZODONE HYDROCHLORIDE 50 MG/1
TABLET, FILM COATED ORAL
Qty: 30 TABLET | Refills: 1 | Status: SHIPPED | OUTPATIENT
Start: 2024-09-05

## 2024-11-07 DIAGNOSIS — F43.23 ADJUSTMENT REACTION WITH ANXIETY AND DEPRESSION: ICD-10-CM

## 2024-11-07 DIAGNOSIS — G47.00 INSOMNIA, UNSPECIFIED TYPE: ICD-10-CM

## 2024-11-07 RX ORDER — TRAZODONE HYDROCHLORIDE 50 MG/1
TABLET, FILM COATED ORAL
Qty: 30 TABLET | Refills: 1 | Status: SHIPPED | OUTPATIENT
Start: 2024-11-07

## 2024-11-16 DIAGNOSIS — F41.9 ANXIETY: ICD-10-CM

## 2024-11-18 RX ORDER — ESCITALOPRAM OXALATE 10 MG/1
10 TABLET ORAL DAILY
Qty: 60 TABLET | Refills: 0 | Status: SHIPPED | OUTPATIENT
Start: 2024-11-18

## 2025-01-12 DIAGNOSIS — F43.23 ADJUSTMENT REACTION WITH ANXIETY AND DEPRESSION: ICD-10-CM

## 2025-01-12 DIAGNOSIS — G47.00 INSOMNIA, UNSPECIFIED TYPE: ICD-10-CM

## 2025-01-13 RX ORDER — TRAZODONE HYDROCHLORIDE 50 MG/1
TABLET, FILM COATED ORAL
Qty: 30 TABLET | Refills: 0 | Status: SHIPPED | OUTPATIENT
Start: 2025-01-13

## 2025-01-26 DIAGNOSIS — F41.9 ANXIETY: ICD-10-CM

## 2025-01-27 RX ORDER — ESCITALOPRAM OXALATE 10 MG/1
10 TABLET ORAL DAILY
Qty: 60 TABLET | Refills: 0 | Status: SHIPPED | OUTPATIENT
Start: 2025-01-27

## 2025-02-20 DIAGNOSIS — F43.23 ADJUSTMENT REACTION WITH ANXIETY AND DEPRESSION: ICD-10-CM

## 2025-02-20 DIAGNOSIS — G47.00 INSOMNIA, UNSPECIFIED TYPE: ICD-10-CM

## 2025-02-20 RX ORDER — TRAZODONE HYDROCHLORIDE 50 MG/1
TABLET ORAL
Qty: 30 TABLET | Refills: 0 | Status: SHIPPED | OUTPATIENT
Start: 2025-02-20

## 2025-03-27 DIAGNOSIS — G47.00 INSOMNIA, UNSPECIFIED TYPE: ICD-10-CM

## 2025-03-27 DIAGNOSIS — F43.23 ADJUSTMENT REACTION WITH ANXIETY AND DEPRESSION: ICD-10-CM

## 2025-03-27 RX ORDER — TRAZODONE HYDROCHLORIDE 50 MG/1
TABLET ORAL
Qty: 30 TABLET | Refills: 0 | Status: SHIPPED | OUTPATIENT
Start: 2025-03-27

## 2025-04-03 DIAGNOSIS — F41.9 ANXIETY: ICD-10-CM

## 2025-04-03 RX ORDER — ESCITALOPRAM OXALATE 10 MG/1
10 TABLET ORAL DAILY
Qty: 45 TABLET | Refills: 0 | Status: SHIPPED | OUTPATIENT
Start: 2025-04-03

## 2025-04-28 ENCOUNTER — VIRTUAL VISIT (OUTPATIENT)
Dept: FAMILY MEDICINE | Facility: CLINIC | Age: 50
End: 2025-04-28

## 2025-04-28 DIAGNOSIS — G47.09 OTHER INSOMNIA: ICD-10-CM

## 2025-04-28 DIAGNOSIS — R06.09 DYSPNEA ON EXERTION: Primary | ICD-10-CM

## 2025-04-28 DIAGNOSIS — E55.9 VITAMIN D DEFICIENCY: ICD-10-CM

## 2025-04-28 DIAGNOSIS — F41.1 GAD (GENERALIZED ANXIETY DISORDER): ICD-10-CM

## 2025-04-28 DIAGNOSIS — Z12.5 SCREENING FOR PROSTATE CANCER: ICD-10-CM

## 2025-04-28 DIAGNOSIS — Z13.1 SCREENING FOR DIABETES MELLITUS: ICD-10-CM

## 2025-04-28 DIAGNOSIS — E78.00 HYPERCHOLESTEREMIA: ICD-10-CM

## 2025-04-28 PROCEDURE — 98006 SYNCH AUDIO-VIDEO EST MOD 30: CPT | Performed by: INTERNAL MEDICINE

## 2025-04-28 ASSESSMENT — ANXIETY QUESTIONNAIRES
2. NOT BEING ABLE TO STOP OR CONTROL WORRYING: MORE THAN HALF THE DAYS
7. FEELING AFRAID AS IF SOMETHING AWFUL MIGHT HAPPEN: NOT AT ALL
3. WORRYING TOO MUCH ABOUT DIFFERENT THINGS: SEVERAL DAYS
GAD7 TOTAL SCORE: 8
5. BEING SO RESTLESS THAT IT IS HARD TO SIT STILL: SEVERAL DAYS
6. BECOMING EASILY ANNOYED OR IRRITABLE: SEVERAL DAYS
GAD7 TOTAL SCORE: 8
4. TROUBLE RELAXING: SEVERAL DAYS
IF YOU CHECKED OFF ANY PROBLEMS ON THIS QUESTIONNAIRE, HOW DIFFICULT HAVE THESE PROBLEMS MADE IT FOR YOU TO DO YOUR WORK, TAKE CARE OF THINGS AT HOME, OR GET ALONG WITH OTHER PEOPLE: SOMEWHAT DIFFICULT
8. IF YOU CHECKED OFF ANY PROBLEMS, HOW DIFFICULT HAVE THESE MADE IT FOR YOU TO DO YOUR WORK, TAKE CARE OF THINGS AT HOME, OR GET ALONG WITH OTHER PEOPLE?: SOMEWHAT DIFFICULT
1. FEELING NERVOUS, ANXIOUS, OR ON EDGE: MORE THAN HALF THE DAYS

## 2025-04-28 NOTE — PROGRESS NOTES
Jono is a 49 year old who is being evaluated via a billable video visit.    How would you like to obtain your AVS? MyChart  If the video visit is dropped, the invitation should be resent by: Text to cell phone: [phone]  Will anyone else be joining your video visit? No      Assessment & Plan   Problem List Items Addressed This Visit       Hypercholesteremia    Relevant Orders    Lipid panel reflex to direct LDL Fasting    Vitamin D deficiency    Relevant Orders    Vitamin D Deficiency     Other Visit Diagnoses       Dyspnea on exertion    -  Primary    Relevant Orders    Exercise Stress Test - Adult    Comprehensive metabolic panel (BMP + Alb, Alk Phos, ALT, AST, Total. Bili, TP)    Screening for diabetes mellitus        Relevant Orders    Hemoglobin A1c    Screening for prostate cancer        Relevant Orders    PSA, screen    Other insomnia        MARISSA (generalized anxiety disorder)                 Jono is doing well, he still has some problem with sleep, he goes to bed but cannot fall asleep easily, he has been taking trazodone and escitalopram, no depression symptoms ,he still drinks THC containing drinks such as seltzer  4 times a week counseled on weaning such.  Can take trazodone 2 to 3 hours before bedtime to see if that helps.  Recommended counseling mental health clinic and he deferred at this time.  Continue with lifestyle changes; exercise activities etc.  Discussed about adequate sleep hygiene.  He describes some dyspnea on exertion and tightness in his chest ,we will do an exercise stress test   He denies any asthma symptoms.  He does have hyperlipidemia that responded well to atorvastatin keep on such, statins ,no side effects can use coenzyme Q10 as needed.  Repeat labs including lipid liver test chemistry A1c PSA screening ,vitamin D has low vitamin D has not been taking.  All questions answered, follow-up in July for a wellness visit         Subjective   Jono is a 49 year old, presenting  for the following health issues:  RECHECK         No data to display              History of Present Illness       Mental Health Follow-up:  Patient presents to follow-up on Anxiety.    Patient's anxiety since last visit has been:  Medium  The patient is not having other symptoms associated with anxiety.  Any significant life events: job concerns, financial concerns and health concerns  Patient is feeling anxious or having panic attacks.  Patient has no concerns about alcohol or drug use.    He eats 2-3 servings of fruits and vegetables daily.He consumes 1 sweetened beverage(s) daily.He exercises with enough effort to increase his heart rate 10 to 19 minutes per day.  He exercises with enough effort to increase his heart rate 3 or less days per week.   He is taking medications regularly.            Review of Systems  Constitutional, HEENT, cardiovascular, pulmonary, gi and gu systems are negative, except as otherwise noted.      Objective           Vitals:  No vitals were obtained today due to virtual visit.    Physical Exam   GENERAL: alert and no distress  EYES: Eyes grossly normal to inspection.  No discharge or erythema, or obvious scleral/conjunctival abnormalities.  RESP: No audible wheeze, cough, or visible cyanosis.    SKIN: Visible skin clear. No significant rash, abnormal pigmentation or lesions.  NEURO: Cranial nerves grossly intact.  Mentation and speech appropriate for age.  PSYCH: Appropriate affect, tone, and pace of words    Office Visit on 07/08/2024   Component Date Value Ref Range Status    Cholesterol 07/08/2024 158  <200 mg/dL Final    Triglycerides 07/08/2024 127  <150 mg/dL Final    Direct Measure HDL 07/08/2024 53  >=40 mg/dL Final    LDL Cholesterol Calculated 07/08/2024 80  <=100 mg/dL Final    Non HDL Cholesterol 07/08/2024 105  <130 mg/dL Final    Patient Fasting > 8hrs? 07/08/2024 No   Final    ALT 07/08/2024 40  0 - 70 U/L Final    Hemoglobin A1C 07/08/2024 5.3  0.0 - 5.6 % Final     Normal <5.7%   Prediabetes 5.7-6.4%    Diabetes 6.5% or higher     Note: Adopted from ADA consensus guidelines.    Vitamin D, Total (25-Hydroxy) 07/08/2024 18 (L)  20 - 50 ng/mL Final    mild to moderate deficiency         Video-Visit Details    Type of service:  Video Visit   Originating Location (pt. Location): Home    Distant Location (provider location):  On-site  Platform used for Video Visit: Julee  Signed Electronically by: Clyde Moore MD

## 2025-05-03 DIAGNOSIS — G47.00 INSOMNIA, UNSPECIFIED TYPE: ICD-10-CM

## 2025-05-03 DIAGNOSIS — F43.23 ADJUSTMENT REACTION WITH ANXIETY AND DEPRESSION: ICD-10-CM

## 2025-05-05 RX ORDER — TRAZODONE HYDROCHLORIDE 50 MG/1
TABLET ORAL
Qty: 30 TABLET | Refills: 0 | Status: SHIPPED | OUTPATIENT
Start: 2025-05-05

## 2025-05-18 DIAGNOSIS — F41.9 ANXIETY: ICD-10-CM

## 2025-05-19 RX ORDER — ESCITALOPRAM OXALATE 10 MG/1
10 TABLET ORAL DAILY
Qty: 90 TABLET | Refills: 0 | Status: SHIPPED | OUTPATIENT
Start: 2025-05-19

## 2025-06-07 DIAGNOSIS — E78.5 HYPERLIPIDEMIA LDL GOAL <100: ICD-10-CM

## 2025-06-07 DIAGNOSIS — F43.23 ADJUSTMENT REACTION WITH ANXIETY AND DEPRESSION: ICD-10-CM

## 2025-06-07 DIAGNOSIS — G47.00 INSOMNIA, UNSPECIFIED TYPE: ICD-10-CM

## 2025-06-09 RX ORDER — ATORVASTATIN CALCIUM 40 MG/1
40 TABLET, FILM COATED ORAL DAILY
Qty: 90 TABLET | Refills: 0 | Status: SHIPPED | OUTPATIENT
Start: 2025-06-09

## 2025-06-09 RX ORDER — TRAZODONE HYDROCHLORIDE 50 MG/1
TABLET ORAL
Qty: 30 TABLET | Refills: 0 | Status: SHIPPED | OUTPATIENT
Start: 2025-06-09

## 2025-07-09 DIAGNOSIS — F43.23 ADJUSTMENT REACTION WITH ANXIETY AND DEPRESSION: ICD-10-CM

## 2025-07-09 DIAGNOSIS — G47.00 INSOMNIA, UNSPECIFIED TYPE: ICD-10-CM

## 2025-07-09 RX ORDER — TRAZODONE HYDROCHLORIDE 50 MG/1
TABLET ORAL
Qty: 30 TABLET | Refills: 0 | Status: SHIPPED | OUTPATIENT
Start: 2025-07-09

## 2025-07-14 ENCOUNTER — OFFICE VISIT (OUTPATIENT)
Dept: FAMILY MEDICINE | Facility: CLINIC | Age: 50
End: 2025-07-14
Payer: COMMERCIAL

## 2025-07-14 VITALS
DIASTOLIC BLOOD PRESSURE: 68 MMHG | RESPIRATION RATE: 16 BRPM | HEART RATE: 64 BPM | BODY MASS INDEX: 26.55 KG/M2 | TEMPERATURE: 98.7 F | OXYGEN SATURATION: 96 % | WEIGHT: 196 LBS | HEIGHT: 72 IN | SYSTOLIC BLOOD PRESSURE: 101 MMHG

## 2025-07-14 DIAGNOSIS — E78.5 HYPERLIPIDEMIA LDL GOAL <100: ICD-10-CM

## 2025-07-14 DIAGNOSIS — Z12.5 SCREENING FOR PROSTATE CANCER: ICD-10-CM

## 2025-07-14 DIAGNOSIS — Z13.1 SCREENING FOR DIABETES MELLITUS: ICD-10-CM

## 2025-07-14 DIAGNOSIS — F43.23 ADJUSTMENT REACTION WITH ANXIETY AND DEPRESSION: ICD-10-CM

## 2025-07-14 DIAGNOSIS — Z00.00 ENCOUNTER FOR ANNUAL WELLNESS VISIT: Primary | ICD-10-CM

## 2025-07-14 DIAGNOSIS — L30.9 ECZEMA, UNSPECIFIED TYPE: ICD-10-CM

## 2025-07-14 DIAGNOSIS — L98.9 SKIN LESION: ICD-10-CM

## 2025-07-14 DIAGNOSIS — R06.09 DYSPNEA ON EXERTION: ICD-10-CM

## 2025-07-14 DIAGNOSIS — Z12.83 SKIN CANCER SCREENING: ICD-10-CM

## 2025-07-14 DIAGNOSIS — E78.00 HYPERCHOLESTEREMIA: ICD-10-CM

## 2025-07-14 DIAGNOSIS — G47.00 INSOMNIA, UNSPECIFIED TYPE: ICD-10-CM

## 2025-07-14 DIAGNOSIS — E55.9 VITAMIN D DEFICIENCY: ICD-10-CM

## 2025-07-14 DIAGNOSIS — F41.9 ANXIETY: ICD-10-CM

## 2025-07-14 PROCEDURE — 99396 PREV VISIT EST AGE 40-64: CPT | Performed by: INTERNAL MEDICINE

## 2025-07-14 PROCEDURE — 3074F SYST BP LT 130 MM HG: CPT | Performed by: INTERNAL MEDICINE

## 2025-07-14 PROCEDURE — 1126F AMNT PAIN NOTED NONE PRSNT: CPT | Performed by: INTERNAL MEDICINE

## 2025-07-14 PROCEDURE — 99214 OFFICE O/P EST MOD 30 MIN: CPT | Mod: 25 | Performed by: INTERNAL MEDICINE

## 2025-07-14 PROCEDURE — 3078F DIAST BP <80 MM HG: CPT | Performed by: INTERNAL MEDICINE

## 2025-07-14 RX ORDER — ATORVASTATIN CALCIUM 40 MG/1
40 TABLET, FILM COATED ORAL DAILY
Qty: 90 TABLET | Refills: 0 | Status: SHIPPED | OUTPATIENT
Start: 2025-07-14

## 2025-07-14 RX ORDER — BUSPIRONE HYDROCHLORIDE 10 MG/1
10 TABLET ORAL DAILY PRN
Qty: 90 TABLET | Refills: 0 | Status: SHIPPED | OUTPATIENT
Start: 2025-07-14

## 2025-07-14 RX ORDER — LORAZEPAM 0.5 MG/1
TABLET ORAL
Qty: 10 TABLET | Refills: 0 | Status: CANCELLED | OUTPATIENT
Start: 2025-07-14

## 2025-07-14 RX ORDER — ESCITALOPRAM OXALATE 10 MG/1
10 TABLET ORAL DAILY
Qty: 90 TABLET | Refills: 0 | Status: SHIPPED | OUTPATIENT
Start: 2025-07-14

## 2025-07-14 RX ORDER — TRAZODONE HYDROCHLORIDE 50 MG/1
TABLET ORAL
Qty: 90 TABLET | Refills: 0 | Status: SHIPPED | OUTPATIENT
Start: 2025-07-14

## 2025-07-14 SDOH — HEALTH STABILITY: PHYSICAL HEALTH: ON AVERAGE, HOW MANY MINUTES DO YOU ENGAGE IN EXERCISE AT THIS LEVEL?: 30 MIN

## 2025-07-14 SDOH — HEALTH STABILITY: PHYSICAL HEALTH: ON AVERAGE, HOW MANY DAYS PER WEEK DO YOU ENGAGE IN MODERATE TO STRENUOUS EXERCISE (LIKE A BRISK WALK)?: 5 DAYS

## 2025-07-14 ASSESSMENT — PAIN SCALES - GENERAL: PAINLEVEL_OUTOF10: NO PAIN (0)

## 2025-07-14 ASSESSMENT — SOCIAL DETERMINANTS OF HEALTH (SDOH): HOW OFTEN DO YOU GET TOGETHER WITH FRIENDS OR RELATIVES?: ONCE A WEEK

## 2025-07-14 NOTE — PROGRESS NOTES
Preventive Care Visit  [facility]  Clyde Moore MD, Internal Medicine  Jul 14, 2025      Assessment & Plan   Problem List Items Addressed This Visit       Hypercholesteremia    Relevant Medications    atorvastatin (LIPITOR) 40 MG tablet    Vitamin D deficiency     Other Visit Diagnoses         Encounter for annual wellness visit    -  Primary      Hyperlipidemia LDL goal <100        Relevant Medications    atorvastatin (LIPITOR) 40 MG tablet      Anxiety        Describes some chest fluttering sensation probably anxiety related.    Relevant Medications    busPIRone (BUSPAR) 10 MG tablet    escitalopram (LEXAPRO) 10 MG tablet    traZODone (DESYREL) 50 MG tablet    Other Relevant Orders    Adult Mental Health  Referral      Insomnia, unspecified type        Relevant Medications    traZODone (DESYREL) 50 MG tablet    Other Relevant Orders    Adult Mental Health  Referral      Adjustment reaction with anxiety and depression        Relevant Medications    traZODone (DESYREL) 50 MG tablet    Other Relevant Orders    Adult Mental Health  Referral      Screening for prostate cancer          Screening for diabetes mellitus          Dyspnea on exertion        Relevant Orders    CBC with platelets      Eczema, unspecified type          Skin cancer screening        Relevant Orders    Adult Dermatology  Referral      Skin lesion        Relevant Orders    Adult Dermatology  Referral          Assessment & Plan  Hyperlipidemia LDL goal <100:  - Cholesterol levels improved with medication.  - Continue current cholesterol medication.    Anxiety:  - Anxiety persists, exacerbated by THC seltzer use.  - Referral to [facility] clinic for short-term evaluation and medication management. Encourage reduction of THC seltzer use.  - Risks and side effects: Potential interaction between THC and escitalopram leading to serotonin syndrome.    Insomnia, unspecified  type:  - Insomnia managed with trazodone.  - Continue trazodone as needed for sleep. Consider sleep hygiene practices and melatonin use.    Screening for prostate cancer:  - Perform PSA screening as part of labs.    Screening for diabetes mellitus:  - Diabetes screen normal.    Vitamin D deficiency:  - Previously low vitamin D levels.  - Continue vitamin D supplementation.    Hypercholesteremia:  - Cholesterol levels improved with medication.  - Continue current cholesterol medication.    Dyspnea on exertion:  - Possible allergy-induced airway spasm.  - Monitor symptoms; consider albuterol inhaler if symptoms persist.    Eczema, unspecified type:  - Eczematous lesion on scalp.  - Apply hydrocortisone 1% to affected area.    Skin cancer screening:  - No significant lesions noted.    Skin lesion:  - Eczematous lesion on scalp.  - Apply hydrocortisone 1% to affected area.        Assessment & Plan  Hyperlipidemia LDL goal <100:  Cholesterol levels have improved significantly with medication, with LDL reduced from 193 to 80.  Continue current cholesterol medication.    Anxiety:  Anxiety persists, with episodes of racing mind and stress-related symptoms.  Referral to [facility] clinic for short-term evaluation and potential medication adjustment. Encourage reduction of THC seltzer and alcohol intake. Consider non-pharmacological interventions such as yoga, meditation, and mindfulness.  Risks and side effects: Potential interaction between THC seltzer and escitalopram, increasing risk of serotonin syndrome.    Insomnia, unspecified type:  Insomnia persists, with trazodone providing some relief.  Continue trazodone as needed for sleep. Encourage sleep hygiene practices and consider melatonin use.    Adjustment reaction with anxiety and depression:  Stress and anxiety have improved but are still present.  Referral to [facility] clinic for evaluation and potential medication adjustment.    Screening  "for prostate cancer:  Perform PSA screening as part of lab work.    Screening for diabetes mellitus:  Diabetes screen was normal with a result of 5.3.    Vitamin D deficiency:  Previously identified low vitamin D levels.  Continue vitamin D supplementation.    Hypercholesteremia:  Cholesterol levels have improved with medication.  Continue current cholesterol medication.    Dyspnea on exertion:  Dyspnea may be allergy-induced, particularly when mowing the lawn.  Monitor symptoms and consider albuterol inhaler if symptoms persist with exertion.       Patient has been advised of split billing requirements and indicates understanding: Yes    BMI  Estimated body mass index is 26.38 kg/m  as calculated from the following:    Height as of this encounter: 1.836 m (6' 0.28\").    Weight as of this encounter: 88.9 kg (196 lb).   Weight management plan: Discussed healthy diet and exercise guidelines    Counseling  Appropriate preventive services were addressed with this patient via screening, questionnaire, or discussion as appropriate for fall prevention, nutrition, physical activity, Tobacco-use cessation, social engagement, weight loss and cognition.  Checklist reviewing preventive services available has been given to the patient.  Reviewed patient's diet, addressing concerns and/or questions.   He is at risk for psychosocial distress and has been provided with information to reduce risk.   Reviewed preventive health counseling, as reflected in patient instructions       Regular exercise       Healthy diet/nutrition       Vision screening       Hearing screening       Immunizations  Vaccines up to date.           Alcohol Use        Family planning       Safe sex practices/STD prevention       Hep C screening for all patients one time for ages 18-79 years       HIV screeninx in teen years, 1x in adult years, and at intervals if high risk       Colorectal cancer screening       Consider prostate cancer screening (age " 55-69)       Advance Care Planning        Subjective   Jono is a 49 year old, presenting for the following:  Physical         No data to display                   HPI  Jono Hensley, 49 years    Anxiety and Sleep Disturbance  - Ongoing anxiety, described as variable in intensity; some days are normal, other days anxiety is present and can feel like having a heart attack  - Anxiety sometimes worse in the morning upon waking; may persist or resolve as the day progresses  - History of using buspirone for anxiety, but has not taken it recently due to lack of prescription  - Reports that stopping nasal spray for allergies led to improvement in heart palpitations  - Noted that use of nasal spray for allergies was associated with increased heart palpitations; after discontinuation, palpitations were less severe  - Reports use of THC seltzer approximately three nights per week; aware that it can exacerbate anxiety  - Reports ongoing use of Lexapro (escitalopram) 10 mg daily for anxiety  - Reports ongoing use of trazodone, half to one tablet at bedtime, for sleep; finds it helpful for sleep, especially when experiencing a racing mind  - If trazodone is missed, experiences difficulty sleeping and racing thoughts; taking trazodone helps return to sleep  - Does not experience next-day tiredness from trazodone  - Has used melatonin in the past for sleep, with some benefit; currently uses it occasionally  - No depression reported  - No family history of anxiety, depression, or other mental health issues  - Anxiety and racing mind began 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; stress and anxiety have improved since then but persist intermittently    Shortness of Breath and Exertional Symptoms  - Experiences shortness of breath and lightheadedness when mowing the lawn, especially on hot days or when exerting more (e.g., pushing up a hill)  - Does not experience shortness  of breath or chest tightness during regular fast walking or treadmill running  - Describes symptoms as more likely related to allergies during mowing  - No chest pain, chest tightness, or heaviness with exertion reported  - No sleep apnea symptoms; occasional snoring, but does not wake up tired or fatigued, and does not nap excessively during the day    Allergies  - History of allergies with watery eyes  - Stopped using nasal spray for allergies due to association with increased heart palpitations; allergy symptoms (e.g., watery eyes) persisted but were less severe after discontinuation of nasal spray    Renal Cysts  - History of several right renal cysts and a right adrenal cyst found on prior CT of abdomen and pelvis  - No blood in urine reported  - No urinary symptoms reported; urine stream is strong    Hyperlipidemia  - On atorvastatin for cholesterol; reports significant improvement in cholesterol levels with medication    Vitamin D Deficiency  - History of low vitamin D; has taken vitamin D 50,000 units as directed    Colorectal Cancer Screening  - Underwent colonoscopy in 2023; results were normal    Skin Lesions  - Reports a dark patch on right cheek and a scaly, raised patch on the back of the scalp at the hairline; not itchy, occasionally noticed when scratching  - No history of seeing a dermatologist for skin cancer    Edema  - Reports ankles get swollen at the end of the day    Family History  - No family history of cancer (including colon or prostate cancer), heart attacks at a young age, or mental health issues reported    Misc  - BMI reported as 26.3  - Gained approximately 10 lbs since last year  - No memory issues reported    History of Present Illness-  Jono Hensley, 49 years    Anxiety and Sleep Disturbance  - Ongoing anxiety, described as variable in intensity; some days are normal, other days anxiety is present and can feel like having a heart attack  - Anxiety sometimes worse in the  morning upon waking; may persist or resolve as the day progresses  - History of using buspirone for anxiety, but has not taken it recently due to lack of prescription  - Reports that stopping nasal spray for allergies led to improvement in heart palpitations  - Noted that use of nasal spray for allergies was associated with increased heart palpitations; after discontinuation, palpitations were less severe  - Reports use of THC seltzer approximately three nights per week; aware that it can exacerbate anxiety  - Reports ongoing use of Lexapro (escitalopram) 10 mg daily for anxiety  - Reports ongoing use of trazodone, half to one tablet at bedtime, for sleep; finds it helpful for sleep, especially when experiencing a racing mind  - If trazodone is missed, experiences difficulty sleeping and racing thoughts; taking trazodone helps return to sleep  - Does not experience next-day tiredness from trazodone  - Has used melatonin in the past for sleep, with some benefit; currently uses it occasionally  - No depression reported  - No family history of anxiety, depression, or other mental health issues  - Anxiety and racing mind began 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; stress and anxiety have improved since then but persist intermittently    Shortness of Breath and Exertional Symptoms  - Experiences shortness of breath and lightheadedness when mowing the lawn, especially on hot days or when exerting more (e.g., pushing up a hill)  - Does not experience shortness of breath or chest tightness during regular fast walking or treadmill running  - Describes symptoms as more likely related to allergies during mowing  - No chest pain, chest tightness, or heaviness with exertion reported  - No sleep apnea symptoms; occasional snoring, but does not wake up tired or fatigued, and does not nap excessively during the day    Allergies  - History of allergies with watery eyes  -  Stopped using nasal spray for allergies due to association with increased heart palpitations; allergy symptoms (e.g., watery eyes) persisted but were less severe after discontinuation of nasal spray    Renal Cysts  - History of several right renal cysts and a right adrenal cyst found on prior CT of abdomen and pelvis  - No blood in urine reported  - No urinary symptoms reported; urine stream is strong    Hyperlipidemia  - On atorvastatin for cholesterol; reports significant improvement in cholesterol levels with medication    Vitamin D Deficiency  - History of low vitamin D; has taken vitamin D 50,000 units as directed    Colorectal Cancer Screening  - Underwent colonoscopy in 2023; results were normal    Skin Lesions  - Reports a dark patch on right cheek and a scaly, raised patch on the back of the scalp at the hairline; not itchy, occasionally noticed when scratching  - No history of seeing a dermatologist for skin cancer    Edema  - Reports ankles get swollen at the end of the day    Family History  - No family history of cancer (including colon or prostate cancer), heart attacks at a young age, or mental health issues reported    Misc  - BMI reported as 26.3  - Gained approximately 10 lbs since last year  - No memory issues reported       Advance Care Planning    Discussed advance care planning with patient; informed AVS has link to [employer].        7/14/2025   General Health   How would you rate your overall physical health? Good   Feel stress (tense, anxious, or unable to sleep) Very much   (!) STRESS CONCERN      7/14/2025   Nutrition   Three or more servings of calcium each day? Yes   Diet: Regular (no restrictions)   How many servings of fruit and vegetables per day? (!) 2-3   How many sweetened beverages each day? 0-1         7/14/2025   Exercise   Days per week of moderate/strenous exercise 5 days   Average minutes spent exercising at this level 30 min         7/14/2025   Social Factors    Frequency of gathering with friends or relatives Once a week   Worry food won't last until get money to buy more No   Food not last or not have enough money for food? No   Do you have housing? (Housing is defined as stable permanent housing and does not include staying outside in a car, in a tent, in an abandoned building, in an overnight shelter, or couch-surfing.) No   Are you worried about losing your housing? No   Lack of transportation? No   Unable to get utilities (heat,electricity)? No   Want help with housing or utility concern? No   (!) HOUSING CONCERN PRESENT      7/14/2025   Fall Risk   Fallen 2 or more times in the past year? No   Trouble with walking or balance? No          7/14/2025   Dental   Dentist two times every year? Yes         Today's PHQ-2 Score:       7/14/2025    11:18 AM   PHQ-2 ( 1999 [employer])   Q1: Little interest or pleasure in doing things 1   Q2: Feeling down, depressed or hopeless 0   PHQ-2 Score 1    Q1: Little interest or pleasure in doing things Several days   Q2: Feeling down, depressed or hopeless Not at all   PHQ-2 Score 1       Patient-reported           7/14/2025   Substance Use   Alcohol more than 3/day or more than 7/wk No   Do you use any other substances recreationally? (!) CANNABIS PRODUCTS     Social History     Tobacco Use    Smoking status: Never    Smokeless tobacco: Never   Vaping Use    Vaping status: Never Used   Substance Use Topics    Alcohol use: Yes     Comment: occasional; stopped in January, beer in a month    Drug use: Yes     Comment: ANDRY WITH THC           7/14/2025   STI Screening   New sexual partner(s) since last STI/HIV test? No   ASCVD Risk   The 10-year ASCVD risk score (Kori BASS, et al., 2019) is: 1.5%    Values used to calculate the score:      Age: 49 years      Sex: Male      Is Non- : No      Diabetic: No      Tobacco smoker: No      Systolic Blood Pressure: 101 mmHg      Is BP treated: No      HDL  Cholesterol: 53 mg/dL      Total Cholesterol: 158 mg/dL            7/14/2025   Contraception/Family Planning   Questions about contraception or family planning No        Reviewed and updated as needed this visit by Provider                    Past Medical History:   Diagnosis Date    Anxiety and depression     Kidney stone      Past Surgical History:   Procedure Laterality Date    COLONOSCOPY N/A 10/31/2023    Procedure: Colonoscopy;  Surgeon: Ismael Harrison MD;  Location:  GI     Lab work is in process  Labs reviewed in EPIC  BP Readings from Last 3 Encounters:   07/14/25 101/68   07/08/24 112/73   10/31/23 102/80    Wt Readings from Last 3 Encounters:   07/14/25 88.9 kg (196 lb)   07/08/24 83.9 kg (185 lb)   07/07/23 85.7 kg (189 lb)                  Patient Active Problem List   Diagnosis    Allergic rhinitis    Dysfunction of right eustachian tube    Gilbert syndrome    Hypercholesteremia    Impaired fasting glucose    Irritable bowel syndrome with diarrhea    Kidney stone    Leg pain, anterior, right    Lichen planus    Vitamin D deficiency     Past Surgical History:   Procedure Laterality Date    COLONOSCOPY N/A 10/31/2023    Procedure: Colonoscopy;  Surgeon: Ismael Harrison MD;  Location:  GI       Social History     Tobacco Use    Smoking status: Never    Smokeless tobacco: Never   Substance Use Topics    Alcohol use: Yes     Comment: occasional; stopped in January, beer in a month     Family History   Problem Relation Age of Onset    Hyperlipidemia Father     Heart Disease Other         Paternal uncles    Glaucoma No family hx of     Macular Degeneration No family hx of     Diabetes No family hx of     Cancer No family hx of          Current Outpatient Medications   Medication Sig Dispense Refill    atorvastatin (LIPITOR) 40 MG tablet Take 1 tablet (40 mg) by mouth daily. 90 tablet 0    busPIRone (BUSPAR) 10 MG tablet Take 1 tablet (10 mg) by mouth daily as needed (anxiety). 90 tablet 0    busPIRone  (BUSPAR) 5 MG tablet 1.5 tablet twice a day as needed for anxiety 60 tablet 1    escitalopram (LEXAPRO) 10 MG tablet Take 1 tablet (10 mg) by mouth daily. 90 tablet 0    LORazepam (ATIVAN) 0.5 MG tablet 1 tablet once daily at bed time as needed for insomnia 10 tablet 0    traZODone (DESYREL) 50 MG tablet TAKE 1/2 TO 1 TABLET BY MOUTH AT BEDTIME FOR SLEEP OR ANXIETY- OFFICE VISIT REQUIRED FOR MORE REFILLS 90 tablet 0     Allergies   Allergen Reactions    Grass     Other Environmental Allergy      Cockroaches    Ragweeds     Seasonal Allergies      Recent Labs   Lab Test 07/08/24  1416 07/07/23  1404   A1C 5.3 5.3   LDL 80 193*   HDL 53 52   TRIG 127 182*   ALT 40 18   CR  --  0.92   GFRESTIMATED  --  >90   POTASSIUM  --  4.4   TSH  --  1.01      Results  - EKG: QT interval not prolonged, normal.  - Colonoscopy (2023): Normal, 10-year follow-up recommended.  - CT of abdomen and pelvis: Not obstructing the right ureteropelvic junction, several right renal cysts noted.  - Cholesterol: Total cholesterol reduced from 193 to 80.  - HDL cholesterol: 53-52, protective.  - Diabetes screen: Normal, 5.3, no prediabetes or diabetes.  - Liver function test: Normal.  - Kidney function: GFR greater than 90, normal.  - Blood counts: Normal.  - Thyroid test: Normal.  - Vitamin D: Low.  - PSA: Screening performed, results not specified.    Results  - EKG: QT interval not prolonged, normal.  - Colonoscopy (2023): Normal, 10-year follow-up recommended.  - CT of abdomen and pelvis: Not obstructing the right ureteropelvic junction, several right renal cysts noted.  - Cholesterol: Total cholesterol reduced from 193 to 80.  - HDL cholesterol: 53-52, protective.  - Diabetes screen: Normal, 5.3, no prediabetes or diabetes.  - Liver function test: Normal.  - Kidney function: GFR greater than 90, normal.  - Blood counts: Normal.  - Thyroid test: Normal.  - Vitamin D: Low.  - PSA: Screening performed, results not specified.     Review of  "Systems  Constitutional, neuro, ENT, endocrine, pulmonary, cardiac, gastrointestinal, genitourinary, musculoskeletal, integument and psychiatric systems are negative, except as otherwise noted.     Objective    Exam  /68 (BP Location: Right arm, Patient Position: Sitting, Cuff Size: Adult Large)   Pulse 64   Temp 98.7  F (37.1  C) (Temporal)   Resp 16   Ht 1.836 m (6' 0.28\")   Wt 88.9 kg (196 lb)   SpO2 96%   BMI 26.38 kg/m     Estimated body mass index is 26.38 kg/m  as calculated from the following:    Height as of this encounter: 1.836 m (6' 0.28\").    Weight as of this encounter: 88.9 kg (196 lb).    Physical Exam  GENERAL: alert and no distress  EYES: Eyes grossly normal to inspection, PERRL and conjunctivae and sclerae normal  HENT: ear canals and TM's normal, nose and mouth without ulcers or lesions  NECK: no adenopathy, no asymmetry, masses, or scars  RESP: lungs clear to auscultation - no rales, rhonchi or wheezes  CV: regular rate and rhythm, normal S1 S2, no S3 or S4, no murmur, click or rub, no peripheral edema  ABDOMEN: soft, nontender, no hepatosplenomegaly, no masses and bowel sounds normal  MS: no gross musculoskeletal defects noted, no edema  SKIN: no suspicious lesions or rashes  NEURO: Normal strength and tone, mentation intact and speech normal  PSYCH: mentation appears normal, affect normal/bright    Physical Exam  HEENT: Pupils equal, round, and reactive to light. Good oral hygiene. No postnasal drip or sores in the mouth.  CARDIOVASCULAR: Heart rate and rhythm normal. No murmur.  ABDOMEN: Soft, nontender, not distended. Normoactive bowel.  MUSCULOSKELETAL: No deep pain, joint pain, or back pain. No musculoskeletal deformities.  SKIN: Slightly pigmented small patch on right cheek, eczematous lesion on the back of the scalp, dry skin, skin tag present. No significant lesions on the back.  NEUROLOGIC: Grossly intact. Reflexes normal bilaterally. No dizziness or " lightheadedness.  EXTREMITIES: No edema in lower extremities, although ankles swell at the end of the day.     Physical Exam  - HEENT: Pupils equal, round, and reactive to light. Good oral hygiene. No postnasal drip or sores in the mouth.  - CARDIOVASCULAR: Heart rate and rhythm normal. No murmur.  - ABDOMEN: Abdomen is soft, nontender, not distended. Bowel movements regular.  - MUSCULOSKELETAL: No deep pain, joint pain, or back pain. No musculoskeletal deformities.  - SKIN: Hyperpigmented patch on right cheek, eczematous patch on the back of the scalp, xerosis, acrochordon present. No significant lesions on the back.  - NEUROLOGIC: Reflexes normal bilaterally. Neurologic examination grossly intact.  - EXTREMITIES: No edema in lower extremities, although ankle edema at the end of the day.   Signed Electronically by: Clyde Moore MD     none

## 2025-07-14 NOTE — PATIENT INSTRUCTIONS
Based on our discussion, I have outlined the following instructions for you:      - Keep taking your current cholesterol medication as you have been.  - Visit the [facility] clinic for a short-term check-up and to see if your medication needs any changes.  - Try to cut down on THC seltzer and alcohol.  - Think about trying activities like yoga, meditation, and mindfulness to help with anxiety.  - Use trazodone when you need help sleeping.  - Practice good sleep habits and think about using melatonin to help you sleep better.  - Go to the [facility] clinic for a check-up and to see if your medication needs any changes.  - Get a PSA test done as part of your lab work to check for prostate health.  - Keep taking your vitamin D supplements.  - Keep taking your current cholesterol medication as you have been.  - Pay attention to your breathing when you exercise, and if you have trouble, think about using an albuterol inhaler.    Thank you again for your visit, and we look forward to supporting you in your journey to better health.    left upper arm

## 2025-07-15 ENCOUNTER — PATIENT OUTREACH (OUTPATIENT)
Dept: CARE COORDINATION | Facility: CLINIC | Age: 50
End: 2025-07-15
Payer: COMMERCIAL

## 2025-07-17 ENCOUNTER — PATIENT OUTREACH (OUTPATIENT)
Dept: CARE COORDINATION | Facility: CLINIC | Age: 50
End: 2025-07-17
Payer: COMMERCIAL

## 2025-08-12 ENCOUNTER — HOSPITAL ENCOUNTER (OUTPATIENT)
Dept: CARDIOLOGY | Facility: CLINIC | Age: 50
Discharge: HOME | End: 2025-08-12
Attending: INTERNAL MEDICINE
Payer: COMMERCIAL

## 2025-08-12 DIAGNOSIS — R06.09 DYSPNEA ON EXERTION: ICD-10-CM

## 2025-08-12 PROCEDURE — 93017 CV STRESS TEST TRACING ONLY: CPT

## (undated) DEVICE — DECANTER BAG 2002S

## (undated) DEVICE — SOL WATER IRRIG 1000ML BOTTLE 2F7114

## (undated) RX ORDER — FENTANYL CITRATE 50 UG/ML
INJECTION, SOLUTION INTRAMUSCULAR; INTRAVENOUS
Status: DISPENSED
Start: 2023-10-31